# Patient Record
Sex: FEMALE | Race: WHITE | NOT HISPANIC OR LATINO | Employment: FULL TIME | URBAN - METROPOLITAN AREA
[De-identification: names, ages, dates, MRNs, and addresses within clinical notes are randomized per-mention and may not be internally consistent; named-entity substitution may affect disease eponyms.]

---

## 2017-10-13 ENCOUNTER — HOSPITAL ENCOUNTER (OUTPATIENT)
Dept: OTHER | Facility: HOSPITAL | Age: 42
Setting detail: SPECIMEN
Discharge: HOME | End: 2017-10-13
Attending: FAMILY MEDICINE | Admitting: FAMILY MEDICINE

## 2021-03-25 ENCOUNTER — OFFICE VISIT (OUTPATIENT)
Dept: FAMILY MEDICINE CLINIC | Facility: CLINIC | Age: 46
End: 2021-03-25

## 2021-03-25 VITALS
DIASTOLIC BLOOD PRESSURE: 70 MMHG | RESPIRATION RATE: 18 BRPM | SYSTOLIC BLOOD PRESSURE: 112 MMHG | OXYGEN SATURATION: 99 % | TEMPERATURE: 96.6 F | HEIGHT: 62 IN | HEART RATE: 64 BPM | WEIGHT: 161.8 LBS | BODY MASS INDEX: 29.77 KG/M2

## 2021-03-25 DIAGNOSIS — K21.9 GASTROESOPHAGEAL REFLUX DISEASE WITHOUT ESOPHAGITIS: Primary | ICD-10-CM

## 2021-03-25 DIAGNOSIS — M26.622 ARTHRALGIA OF LEFT TEMPOROMANDIBULAR JOINT: ICD-10-CM

## 2021-03-25 PROBLEM — J30.2 SEASONAL ALLERGIES: Status: ACTIVE | Noted: 2021-03-25

## 2021-03-25 PROCEDURE — 99214 OFFICE O/P EST MOD 30 MIN: CPT | Performed by: FAMILY MEDICINE

## 2021-03-25 RX ORDER — ACETAMINOPHEN AND CODEINE PHOSPHATE 120; 12 MG/5ML; MG/5ML
1 SOLUTION ORAL DAILY
COMMUNITY
Start: 2021-03-12 | End: 2023-03-02 | Stop reason: SDUPTHER

## 2021-03-25 RX ORDER — BUPROPION HYDROCHLORIDE 150 MG/1
150 TABLET ORAL EVERY MORNING
COMMUNITY
Start: 2021-03-14

## 2021-03-25 RX ORDER — VALACYCLOVIR HYDROCHLORIDE 500 MG/1
TABLET, FILM COATED ORAL AS NEEDED
COMMUNITY
End: 2022-05-19 | Stop reason: SDUPTHER

## 2021-03-25 RX ORDER — PANTOPRAZOLE SODIUM 40 MG/1
40 TABLET, DELAYED RELEASE ORAL DAILY
Qty: 30 TABLET | Refills: 3 | Status: SHIPPED | OUTPATIENT
Start: 2021-03-25 | End: 2021-07-21

## 2021-03-25 RX ORDER — QUETIAPINE FUMARATE 50 MG/1
TABLET, FILM COATED ORAL
COMMUNITY
Start: 2021-03-14

## 2021-03-25 NOTE — PROGRESS NOTES
"Chief Complaint  Establish Care, Earache (left ear), and Heartburn    Subjective          Kelly Wilson presents to [facility]  Patient is new to me today.  She is here to discuss her history of reflux.  She has had reflux since her teenage years.  She has tried several different medications over-the-counter for her reflux but the one that tends to help the most is Prilosec.  She currently is taking 40 mg once daily and sometimes twice daily when it is really bad.  She has been taking Prilosec for the last 12 to 13 years.  In 2015 the patient saw GI and had an endoscopy which was found to be normal except for inflammation.  She had had a prior endoscopy and colonoscopy in the 90s which revealed a duodenal ulceration.     She has also been having a new problem.  She states that she has some pain around her left ear region for the past 5 days.  When she opens her mouth it activates the pain.  She denies any discharge from the ear or decreased hearing.  No dizziness or lightheadedness. But she has some brief intermittent ringing in the left ear in the past week.  Patient does suffer from allergies.  She has not been taking anything yet.  Sudafed helped today.  She did have a sinus infection at the end of Feb.        Objective   Vital Signs:   /70 (BP Location: Left arm, Patient Position: Sitting, Cuff Size: Adult)   Pulse 64   Temp 96.6 °F (35.9 °C) (Temporal)   Resp 18   Ht 157.5 cm (62\")   Wt 73.4 kg (161 lb 12.8 oz)   SpO2 99%   BMI 29.59 kg/m²     Physical Exam  Vitals and nursing note reviewed.   Constitutional:       Appearance: Normal appearance. She is well-developed.   HENT:      Head: Normocephalic and atraumatic.   Eyes:      General: No scleral icterus.     Conjunctiva/sclera: Conjunctivae normal.   Cardiovascular:      Rate and Rhythm: Normal rate and regular rhythm.      Heart sounds: Normal heart sounds.   Pulmonary:      Effort: Pulmonary effort is normal.     "  Breath sounds: Normal breath sounds.   Abdominal:      General: Bowel sounds are normal. There is no distension.      Palpations: Abdomen is soft. There is no mass.      Tenderness: There is no abdominal tenderness. There is no right CVA tenderness, left CVA tenderness, guarding or rebound.      Hernia: No hernia is present.   Musculoskeletal:         General: Normal range of motion.      Cervical back: Normal range of motion and neck supple.      Right lower leg: No edema.      Left lower leg: No edema.   Skin:     General: Skin is warm and dry.      Capillary Refill: Capillary refill takes less than 2 seconds.      Findings: No rash.   Neurological:      Mental Status: She is alert and oriented to person, place, and time.   Psychiatric:         Mood and Affect: Mood normal.         Behavior: Behavior normal.         Thought Content: Thought content normal.         Judgment: Judgment normal.        Result Review :                 Assessment and Plan    Diagnoses and all orders for this visit:    1. Gastroesophageal reflux disease without esophagitis (Primary)  -     pantoprazole (PROTONIX) 40 MG EC tablet; Take 1 tablet by mouth Daily.  Dispense: 30 tablet; Refill: 3    2. Dislocation of temporomandibular joint, initial encounter      I have recommended the patient follow-up with her dentist and be fitted for a bite guard for the TMJ.  If she chooses to take ibuprofen she should take it only with food and at the lowest effective dose for the shortest duration.  She may use ice topically.  I will switch the PPI from omeprazole to pantoprazole 40 mg daily.  I have informed the patient that I would like her symptoms fully controlled on the lowest effective dose.  I would consider GI referral in the future if we are unable to taper her off of PPIs over the next year.  Patient was given information regarding food choices with reflux.      Follow Up   Return in about 3 months (around 6/25/2021) for Annual  physical.  Patient was given instructions and counseling regarding her condition or for health maintenance advice. Please see specific information pulled into the AVS if appropriate.

## 2021-03-25 NOTE — PATIENT INSTRUCTIONS
Food Choices for Gastroesophageal Reflux Disease, Adult  When you have gastroesophageal reflux disease (GERD), the foods you eat and your eating habits are very important. Choosing the right foods can help ease your discomfort. Think about working with a food expert (dietitian) to help you make good choices.  What are tips for following this plan?  Reading food labels  · Read the label for foods that are low in saturated fat. Foods that may help with your symptoms include:  ? Foods that have less than 5% of daily value (DV) of fat.  ? Foods that have 0 grams of trans fat.  Cooking  · Do not acevedo your food. Cook your food by baking, steaming, grilling, or broiling. These are all methods that do not need a lot of fat for cooking.  · To add flavor, try to use herbs that are low in spice and acidity.  Meal planning    · Choose healthy foods that are low in fat, such as:  ? Fruits and vegetables.  ? Whole grains.  ? Low-fat dairy products.  ? Lean meats, fish, and poultry.  · Eat small meals often instead of eating 3 large meals each day. Eat your meals slowly in a place where you are relaxed. Avoid bending over or lying down until 2-3 hours after eating.  · Limit high-fat foods such as fatty meats or fried foods.  · Limit your intake of oils, butter, and shortening to less than 8 teaspoons each day.  · Avoid the following:  ? Foods that cause symptoms. These may be different for different people. Keep a food diary to keep track of foods that cause symptoms.  ? Alcohol.  ? Drinking a lot of liquid with meals.  ? Eating meals during the 2-3 hours before bed.  Lifestyle  · Stay at a healthy weight. Ask your doctor what weight is healthy for you. If you need to lose weight, work with your doctor to do so safely.  · Exercise for at least 30 minutes on 5 or more days each week, or as told by your doctor.  · Wear loose-fitting clothes.  · Do not use any products that contain nicotine or tobacco, such as cigarettes,  e-cigarettes, and chewing tobacco. If you need help quitting, ask your doctor.  · Sleep with the head of your bed higher than your feet. Use a wedge under the mattress or blocks under the bed frame to raise the head of the bed.  What foods should eat?    Eat a healthy, well-balanced diet of fruits, vegetables, whole grains, low-fat dairy products, lean meats, fish, and poultry. Each person is different. Foods that may cause symptoms in one person may not cause any symptoms in another person. Work with your doctor to find foods that are safe for you.  The items listed above may not be a complete list of foods and beverages you can eat. Contact a dietitian for more information.  What foods should I avoid?  Limiting some of these foods may help in managing the symptoms of GERD. Everyone is different. Talk with a food expert or your doctor to help you find the exact foods to avoid, if any.  Fruits  Any fruits prepared with added fat. Any fruits that cause symptoms. For some people, this may include citrus fruits, such as oranges, grapefruit, pineapple, and caroline.  Vegetables  Deep-fried vegetables. French fries. Any vegetables prepared with added fat. Any vegetables that cause symptoms. For some people, this may include tomatoes and tomato products, chili peppers, onions and garlic, and horseradish.  Grains  Pastries or quick breads with added fat.  Meats and other proteins  High-fat meats, such as fatty beef or pork, hot dogs, ribs, ham, sausage, salami, and coy. Fried meat or protein, including fried fish and fried chicken. Nuts and nut butters.  Dairy  Whole milk and chocolate milk. Sour cream. Cream. Ice cream. Cream cheese. Milkshakes.  Fats and oils  Butter. Margarine. Shortening. Ghee.  Beverages  Coffee and tea, with or without caffeine. Carbonated beverages. Sodas. Energy drinks. Fruit juice made with acidic fruits (such as orange or grapefruit). Tomato juice. Alcoholic drinks.  Sweets and  desserts  Chocolate and cocoa. Donuts.  Seasonings and condiments  Pepper. Peppermint and spearmint. Added salt. Any condiments, herbs, or seasonings that cause symptoms. For some people, this may include geiger, hot sauce, or vinegar-based salad dressings.  The items listed above may not be a complete list of foods and beverages you should avoid. Contact a dietitian for more information.  Questions to ask your doctor  Diet and lifestyle changes are often the first steps that are taken to manage symptoms of GERD. If diet and lifestyle changes do not help, talk with your doctor about taking medicines.  Where to find more information  · [employer] G[employer]: aboutgerd.org  Summary  · When you have GERD, food and lifestyle choices are very important in easing your symptoms.  · Eat small meals often instead of 3 large meals a day. Eat your meals slowly and in a place where you are relaxed.  · Avoid bending over or lying down until 2-3 hours after eating.  · Limit high-fat foods such as fatty meat or fried foods.  This information is not intended to replace advice given to you by your health care provider. Make sure you discuss any questions you have with your health care provider.  Document Revised: 10/12/2020 Document Reviewed: 10/12/2020  Elsevier Patient Education © 2021 [employer]

## 2021-07-21 DIAGNOSIS — K21.9 GASTROESOPHAGEAL REFLUX DISEASE WITHOUT ESOPHAGITIS: ICD-10-CM

## 2021-07-21 RX ORDER — PANTOPRAZOLE SODIUM 40 MG/1
40 TABLET, DELAYED RELEASE ORAL DAILY
Qty: 30 TABLET | Refills: 3 | Status: SHIPPED | OUTPATIENT
Start: 2021-07-21 | End: 2021-09-01

## 2021-07-28 ENCOUNTER — TELEPHONE (OUTPATIENT)
Dept: FAMILY MEDICINE CLINIC | Facility: CLINIC | Age: 46
End: 2021-07-28

## 2021-07-28 ENCOUNTER — OFFICE VISIT (OUTPATIENT)
Dept: FAMILY MEDICINE CLINIC | Facility: CLINIC | Age: 46
End: 2021-07-28

## 2021-07-28 VITALS
HEART RATE: 83 BPM | HEIGHT: 62 IN | DIASTOLIC BLOOD PRESSURE: 78 MMHG | OXYGEN SATURATION: 99 % | TEMPERATURE: 97.8 F | WEIGHT: 162.8 LBS | SYSTOLIC BLOOD PRESSURE: 122 MMHG | BODY MASS INDEX: 29.96 KG/M2

## 2021-07-28 DIAGNOSIS — Z00.00 ENCOUNTER FOR WELLNESS EXAMINATION IN ADULT: Primary | ICD-10-CM

## 2021-07-28 DIAGNOSIS — K21.9 GASTROESOPHAGEAL REFLUX DISEASE WITHOUT ESOPHAGITIS: ICD-10-CM

## 2021-07-28 DIAGNOSIS — Z13.220 ENCOUNTER FOR LIPID SCREENING FOR CARDIOVASCULAR DISEASE: ICD-10-CM

## 2021-07-28 DIAGNOSIS — Z13.6 ENCOUNTER FOR LIPID SCREENING FOR CARDIOVASCULAR DISEASE: ICD-10-CM

## 2021-07-28 DIAGNOSIS — Z13.1 SCREENING FOR DIABETES MELLITUS: ICD-10-CM

## 2021-07-28 LAB
ALBUMIN SERPL-MCNC: 4.2 G/DL (ref 3.5–5.2)
ALBUMIN/GLOB SERPL: 1.8 G/DL
ALP SERPL-CCNC: 79 U/L (ref 39–117)
ALT SERPL-CCNC: 22 U/L (ref 1–33)
AST SERPL-CCNC: 19 U/L (ref 1–32)
BILIRUB SERPL-MCNC: 0.3 MG/DL (ref 0–1.2)
BUN SERPL-MCNC: 9 MG/DL (ref 6–20)
BUN/CREAT SERPL: 11.1 (ref 7–25)
CALCIUM SERPL-MCNC: 9.9 MG/DL (ref 8.6–10.5)
CHLORIDE SERPL-SCNC: 105 MMOL/L (ref 98–107)
CHOLEST SERPL-MCNC: 205 MG/DL (ref 0–200)
CO2 SERPL-SCNC: 27.8 MMOL/L (ref 22–29)
CREAT SERPL-MCNC: 0.81 MG/DL (ref 0.57–1)
GLOBULIN SER CALC-MCNC: 2.4 GM/DL
GLUCOSE SERPL-MCNC: 95 MG/DL (ref 65–99)
HDLC SERPL-MCNC: 54 MG/DL (ref 40–60)
LDLC SERPL CALC-MCNC: 127 MG/DL (ref 0–100)
POTASSIUM SERPL-SCNC: 4.3 MMOL/L (ref 3.5–5.2)
PROT SERPL-MCNC: 6.6 G/DL (ref 6–8.5)
SODIUM SERPL-SCNC: 141 MMOL/L (ref 136–145)
TRIGL SERPL-MCNC: 135 MG/DL (ref 0–150)
VLDLC SERPL CALC-MCNC: 24 MG/DL (ref 5–40)

## 2021-07-28 PROCEDURE — 99396 PREV VISIT EST AGE 40-64: CPT | Performed by: FAMILY MEDICINE

## 2021-07-28 NOTE — PROGRESS NOTES
Subjective   Kelly Wilson is a 45 y.o. female who presents for annual female wellness exam.  Chief Complaint   Patient presents with   • Annual Exam     Without PAP   Patient is also here to follow-up on GERD.  I had started her on pantoprazole 40 mg daily and the patient did not have any relief of her heartburn symptoms.  She stated that she took as many as up to 3 pantoprazole daily without relief.  In the past she saw GI and had endoscopy in 2015 with [facility], Dr. Jaimes.  After reviewing some of the care everywhere documents from Dr. Jaimes it appears she may have a diagnosis of Bergman's esophagitis.     Menstrual History: July 23, 2021 was her last menstrual period, regular  Pregnancy History: G0  Sexual History: Monogamous male partner for the past 4 years.  Contraception: OCPs  Hormone Replacement Therapy: No  Diet: balanced  Exercise: walking most days, gym- some weights  Do you feel safe? Yes  Have you ever been abused? No    Mammogram: per Gyn, 7/20/2020  Pap Smear: 2019 per [facility], normal.  She has a appointment tomorrow with GYN for her Pap smear  Bone Density: never  Colon Cancer Screening: Patient is being referred back to Dr. Jaimes, [facility] and I have advised her to ask Dr. Jaimes when she should start her colon cancer screening since she is almost 46.    Immunization History   Administered Date(s) Administered   • COVID-19 (MODERNA) 12/22/2020, 01/20/2021       The following portions of the patient's history were reviewed and updated as appropriate: allergies, current medications, past family history, past medical history, past social history, past surgical history and problem list.    Past Medical History:   Diagnosis Date   • Anxiety    • Depressed    • GERD (gastroesophageal reflux disease)    • PMDD (premenstrual dysphoric disorder)        Past Surgical History:   Procedure Laterality Date   • CHOLECYSTECTOMY  1997   • WISDOM TOOTH EXTRACTION         Family  History   Problem Relation Age of Onset   • Diabetes type II Mother    • Heart failure Father    • Diabetes type II Father    • Heart disease Father    • Heart attack Father    • Diabetes type II Brother    • Stomach cancer Maternal Grandmother    • Heart attack Maternal Grandfather    • Diverticulitis Paternal Grandmother    • Heart failure Paternal Grandmother    • Heart disease Paternal Grandfather    • No Known Problems Brother        Social History     Socioeconomic History   • Marital status:      Spouse name: Not on file   • Number of children: 0   • Years of education: Not on file   • Highest education level: Not on file   Tobacco Use   • Smoking status: Former Smoker     Packs/day: 0.25     Years: 20.00     Pack years: 5.00     Start date: 1990     Quit date: 2020     Years since quittin.6   • Smokeless tobacco: Never Used   Vaping Use   • Vaping Use: Never used   Substance and Sexual Activity   • Alcohol use: Yes     Comment: social    • Drug use: No   • Sexual activity: Yes     Partners: Male       Review of Systems   Constitutional: Negative for activity change, appetite change, fatigue and unexpected weight change.   HENT: Negative for congestion, ear pain, nosebleeds, sore throat and tinnitus.    Eyes: Negative for pain, redness and visual disturbance.   Respiratory: Negative for cough, shortness of breath and wheezing.    Cardiovascular: Negative for chest pain, palpitations and leg swelling.   Gastrointestinal: Negative for abdominal pain, blood in stool and nausea.   Endocrine: Negative for polydipsia and polyuria.   Genitourinary: Negative for dysuria, frequency, menstrual problem and vaginal discharge.   Musculoskeletal: Negative for arthralgias, joint swelling and myalgias.   Skin: Negative for rash.   Allergic/Immunologic: Negative for environmental allergies, food allergies and immunocompromised state.   Neurological: Negative for dizziness, speech difficulty, weakness and  headaches.   Hematological: Negative for adenopathy. Does not bruise/bleed easily.   Psychiatric/Behavioral: Negative for decreased concentration and dysphoric mood. The patient is not nervous/anxious.        Objective   Vitals:    07/28/21 0810   BP: 122/78   Pulse: 83   Temp: 97.8 °F (36.6 °C)   SpO2: 99%     Body mass index is 29.78 kg/m².  Physical Exam  Vitals and nursing note reviewed.   Constitutional:       Appearance: Normal appearance. She is well-developed.   HENT:      Head: Normocephalic and atraumatic.   Eyes:      General: No scleral icterus.     Conjunctiva/sclera: Conjunctivae normal.   Cardiovascular:      Rate and Rhythm: Normal rate and regular rhythm.      Heart sounds: Normal heart sounds.   Pulmonary:      Effort: Pulmonary effort is normal.      Breath sounds: Normal breath sounds.   Abdominal:      General: Bowel sounds are normal.      Palpations: Abdomen is soft.   Musculoskeletal:         General: Normal range of motion.      Cervical back: Normal range of motion and neck supple.      Right lower leg: No edema.      Left lower leg: No edema.   Skin:     General: Skin is warm and dry.      Capillary Refill: Capillary refill takes less than 2 seconds.      Findings: No rash.   Neurological:      Mental Status: She is alert and oriented to person, place, and time.   Psychiatric:         Behavior: Behavior normal.         Thought Content: Thought content normal.         Judgment: Judgment normal.         Assessment/Plan   Diagnoses and all orders for this visit:    1. Encounter for wellness examination in adult (Primary)    2. Gastroesophageal reflux disease without esophagitis  -     Ambulatory Referral to Gastroenterology    3. Encounter for lipid screening for cardiovascular disease  -     Lipid Panel    4. Screening for diabetes mellitus  -     Comprehensive Metabolic Panel        Patient is not fasting today she did have a breakfast bar and a coffee before coming into the office.  All  screening is up-to-date or will be brought up-to-date after she sees GI and Gyn   Discussed the importance of maintaining a healthy weight and getting regular exercise.  Educated patient on the benefits of healthy diet.  Advise follow-up annually for wellness exams.      There are no Patient Instructions on file for this visit.

## 2021-07-28 NOTE — TELEPHONE ENCOUNTER
PATIENT STATES SHE SEEN DR. CASTILLO THIS MORNING AND SHE WAS GOING TO RE- REFER HER TO HER PREVIOUS GASTRO DOCTOR BUT PATIENT IS REQUESTING FOR DR. CASTILLO TO ACTUALLY PUT HER IN A REFERRAL TO SOMEONE DIFFERENT, SOMEONE WHO IS IN THE [facility] NETWORK AS WELL...      PATIENT CAN BE REACHED AT: [phone]

## 2021-08-24 ENCOUNTER — OFFICE VISIT (OUTPATIENT)
Dept: FAMILY MEDICINE CLINIC | Facility: CLINIC | Age: 46
End: 2021-08-24

## 2021-08-24 VITALS
BODY MASS INDEX: 29 KG/M2 | DIASTOLIC BLOOD PRESSURE: 70 MMHG | OXYGEN SATURATION: 98 % | TEMPERATURE: 98.3 F | SYSTOLIC BLOOD PRESSURE: 110 MMHG | HEIGHT: 62 IN | HEART RATE: 73 BPM | WEIGHT: 157.6 LBS

## 2021-08-24 DIAGNOSIS — H00.14 CHALAZION OF LEFT UPPER EYELID: ICD-10-CM

## 2021-08-24 DIAGNOSIS — G44.219 EPISODIC TENSION-TYPE HEADACHE, NOT INTRACTABLE: Primary | ICD-10-CM

## 2021-08-24 PROCEDURE — 99214 OFFICE O/P EST MOD 30 MIN: CPT | Performed by: FAMILY MEDICINE

## 2021-08-24 NOTE — PROGRESS NOTES
"Answers for HPI/ROS submitted by the patient on 8/24/2021  Please describe your symptoms.: Eye pain and ongoing headaches  Have you had these symptoms before?: Yes  How long have you been having these symptoms?: Greater than 2 weeks  What is the primary reason for your visit?: Other    Chief Complaint  Skin Lesion (Under eye, Left) and Headache (Going on for about 5 months, starts from back of head moves upwards)    Allison Wilson presents to [facility]  Patient is here today with a new problem.  3 days ago the patient started noticing a bump within her left upper eyelid.  She has done some warm compresses and it has gotten a little bit better but it is still there.  She denies any eye pain or vision problems.      She is also here to discuss headaches.  This is a new problem for her.  She states that over the past 5 months she has been having more headaches.  They generally start as neck pain and bilateral shoulder blade pain and then move forward over both sides of her head.  The patient states that it feels like a vice on the back of her head and neck.  Acetaminophen or ibuprofen does help some.  Patient denies any vision problems but she does have a vision exam scheduled for later this week.  She also denies any dizziness or other neurologic symptoms.  Patient denies any nausea associated with her headaches.  Patient does work and go to school so she does have a good bit of stress but she feels that she is handling it well.        Objective   Vital Signs:   /70   Pulse 73   Temp 98.3 °F (36.8 °C)   Ht 157.5 cm (62\")   Wt 71.5 kg (157 lb 9.6 oz)   SpO2 98%   BMI 28.83 kg/m²     Physical Exam  Vitals and nursing note reviewed.   Constitutional:       Appearance: She is well-developed.   HENT:      Head: Normocephalic and atraumatic.   Eyes:      General: No scleral icterus.     Conjunctiva/sclera: Conjunctivae normal.   Cardiovascular:      Rate and " Rhythm: Normal rate and regular rhythm.      Heart sounds: Normal heart sounds.   Pulmonary:      Effort: Pulmonary effort is normal.      Breath sounds: Normal breath sounds.   Abdominal:      General: Bowel sounds are normal.      Palpations: Abdomen is soft.   Musculoskeletal:         General: Normal range of motion.      Cervical back: Normal range of motion and neck supple.   Skin:     General: Skin is warm and dry.      Capillary Refill: Capillary refill takes less than 2 seconds.      Findings: No rash.   Neurological:      General: No focal deficit present.      Mental Status: She is alert and oriented to person, place, and time.      Cranial Nerves: Cranial nerves are intact.      Sensory: Sensation is intact.      Motor: Motor function is intact.      Coordination: Coordination is intact.      Gait: Gait is intact.      Deep Tendon Reflexes:      Reflex Scores:       Tricep reflexes are 2+ on the right side and 2+ on the left side.       Bicep reflexes are 2+ on the right side and 2+ on the left side.       Brachioradialis reflexes are 2+ on the right side and 2+ on the left side.       Patellar reflexes are 2+ on the right side and 2+ on the left side.       Achilles reflexes are 2+ on the right side and 2+ on the left side.     Comments: Normal visual fields bilaterally   Psychiatric:         Mood and Affect: Mood normal.         Behavior: Behavior normal.         Thought Content: Thought content normal.         Judgment: Judgment normal.        Result Review :{Labs  Result Review  Imaging  Med Tab  Media  Procedures :23}   The following data was reviewed by: Lynnette Yan DO on 08/24/2021:  Common labs    Common Labsle 7/28/21 7/28/21    0855 0855   Glucose  95   BUN  9   Creatinine  0.81   eGFR Non  Am  77   eGFR  Am  93   Sodium  141   Potassium  4.3   Chloride  105   Calcium  9.9   Total Protein  6.6   Albumin  4.20   Total Bilirubin  0.3   Alkaline Phosphatase  79   AST (SGOT)   19   ALT (SGPT)  22   Total Cholesterol 205 (A)    Triglycerides 135    HDL Cholesterol 54    LDL Cholesterol  127 (A)    (A) Abnormal value       Comments are available for some flowsheets but are not being displayed.                         Assessment and Plan    Diagnoses and all orders for this visit:    1. Episodic tension-type headache, not intractable (Primary)    2. Chalazion of left upper eyelid    Patient is here today with 2 new problems.  For the tension headaches I have advised the patient to exercise daily.  She may use over-the-counter painkillers as needed but avoid daily use.  It is also recommended that she try meditation as well.  Patient states that she has been planning to restart exercise.  For the chalazion I have advised warm compresses as often as possible until gone.  If the patient has any worsening symptoms she is advised to follow-up.  I spent 32 minutes caring for Kelly on this date of service. This time includes time spent by me in the following activities:reviewing tests, performing a medically appropriate examination and/or evaluation , counseling and educating the patient/family/caregiver and documenting information in the medical record  Follow Up   Return if symptoms worsen or fail to improve.  Patient was given instructions and counseling regarding her condition or for health maintenance advice. Please see specific information pulled into the AVS if appropriate.

## 2021-09-01 ENCOUNTER — TELEPHONE (OUTPATIENT)
Dept: GASTROENTEROLOGY | Facility: CLINIC | Age: 46
End: 2021-09-01

## 2021-09-01 ENCOUNTER — OFFICE VISIT (OUTPATIENT)
Dept: GASTROENTEROLOGY | Facility: CLINIC | Age: 46
End: 2021-09-01

## 2021-09-01 VITALS
SYSTOLIC BLOOD PRESSURE: 118 MMHG | DIASTOLIC BLOOD PRESSURE: 70 MMHG | WEIGHT: 160.5 LBS | BODY MASS INDEX: 29.53 KG/M2 | TEMPERATURE: 97.5 F | HEIGHT: 62 IN | HEART RATE: 84 BPM

## 2021-09-01 DIAGNOSIS — Z12.11 ENCOUNTER FOR SCREENING FOR MALIGNANT NEOPLASM OF COLON: ICD-10-CM

## 2021-09-01 DIAGNOSIS — K21.9 GASTROESOPHAGEAL REFLUX DISEASE WITHOUT ESOPHAGITIS: Primary | ICD-10-CM

## 2021-09-01 PROCEDURE — 99204 OFFICE O/P NEW MOD 45 MIN: CPT | Performed by: INTERNAL MEDICINE

## 2021-09-01 RX ORDER — SODIUM PHOSPHATE,MONO-DIBASIC 19G-7G/118
ENEMA (ML) RECTAL DAILY
COMMUNITY
End: 2022-02-25

## 2021-09-01 RX ORDER — DEXLANSOPRAZOLE 60 MG/1
60 CAPSULE, DELAYED RELEASE ORAL DAILY
Qty: 90 CAPSULE | Refills: 1 | Status: SHIPPED | OUTPATIENT
Start: 2021-09-01 | End: 2021-09-03

## 2021-09-01 RX ORDER — OMEPRAZOLE 20 MG/1
20 CAPSULE, DELAYED RELEASE ORAL 2 TIMES DAILY
COMMUNITY
End: 2021-09-01

## 2021-09-01 RX ORDER — MAGNESIUM 200 MG
TABLET ORAL DAILY
COMMUNITY
End: 2022-10-12

## 2021-09-01 RX ORDER — SODIUM CHLORIDE, SODIUM LACTATE, POTASSIUM CHLORIDE, CALCIUM CHLORIDE 600; 310; 30; 20 MG/100ML; MG/100ML; MG/100ML; MG/100ML
30 INJECTION, SOLUTION INTRAVENOUS CONTINUOUS
Status: CANCELLED | OUTPATIENT
Start: 2021-10-05

## 2021-09-01 NOTE — PROGRESS NOTES
Chief Complaint   Patient presents with   • Heartburn   • Bergman's esophagus     Subjective   HPI  Kelly Wilson is a 45 y.o. female who presents today for new patient evaluation.    She complains of longstanding GERD.  Symptoms dating back to her teenage years.  Sensation of burning through her chest and fluid in her throat on occasion.  She has been on various over-the-counter and prescription-based PPI therapy with intermittent success.  More recently she was put on Protonix 40 mg by her PCP but did not find this to be very effective at all and actually switch back to over-the-counter omeprazole.    She had previous upper GI evaluation in 2015 with Dr. Jaimes.  I was able to review those records through care everywhere.  There was evidence of some mild reflux esophagitis and suspicion for Bergman's but biopsies returned showing no evidence of intestinal metaplasia.  She had a remote colonoscopy in the early 2000's.  She has no lower GI complaints.      Objective   Vitals:    09/01/21 0854   BP: 118/70   Pulse: 84   Temp: 97.5 °F (36.4 °C)     Physical Exam  Vitals reviewed.   Constitutional:       Appearance: She is well-developed.   Cardiovascular:      Rate and Rhythm: Normal rate and regular rhythm.      Heart sounds: Normal heart sounds.   Pulmonary:      Effort: Pulmonary effort is normal. No respiratory distress.      Breath sounds: Normal breath sounds. No wheezing.   Abdominal:      General: Bowel sounds are normal.      Palpations: Abdomen is soft.   Musculoskeletal:      Cervical back: Normal range of motion and neck supple.   Skin:     General: Skin is warm and dry.   Neurological:      Mental Status: She is alert and oriented to person, place, and time.   Psychiatric:         Mood and Affect: Mood and affect normal.         Behavior: Behavior normal.         Thought Content: Thought content normal.         Cognition and Memory: Memory normal.         Judgment: Judgment normal.       The following data  was reviewed by: Obed Hendrix MD on 09/01/2021:  Common labs    Common Labsle 7/28/21 7/28/21    0855 0855   Glucose  95   BUN  9   Creatinine  0.81   eGFR Non  Am  77   eGFR  Am  93   Sodium  141   Potassium  4.3   Chloride  105   Calcium  9.9   Total Protein  6.6   Albumin  4.20   Total Bilirubin  0.3   Alkaline Phosphatase  79   AST (SGOT)  19   ALT (SGPT)  22   Total Cholesterol 205 (A)    Triglycerides 135    HDL Cholesterol 54    LDL Cholesterol  127 (A)    (A) Abnormal value       Comments are available for some flowsheets but are not being displayed.           Data reviewed: GI studies From [facility] 2015     Assessment/Plan   Assessment:     1. Gastroesophageal reflux disease without esophagitis    2. Encounter for screening for malignant neoplasm of colon      Plan:   Change PPI to Dexilant 60mg/day  Recommend addition of Gaviscon Advance, information on how to order provided  Schedule EGD and average risk screening colonoscopy            Obed Hendrix M.D.  [facility]  [address] [place] [address]  Office: [phone]

## 2021-09-03 ENCOUNTER — TELEPHONE (OUTPATIENT)
Dept: GASTROENTEROLOGY | Facility: CLINIC | Age: 46
End: 2021-09-03

## 2021-09-03 RX ORDER — OMEPRAZOLE 40 MG/1
40 CAPSULE, DELAYED RELEASE ORAL DAILY
Qty: 90 CAPSULE | Refills: 3 | Status: SHIPPED | OUTPATIENT
Start: 2021-09-03 | End: 2022-08-11 | Stop reason: SDUPTHER

## 2021-09-03 RX ORDER — SUCRALFATE 1 G/1
1 TABLET ORAL
Qty: 120 TABLET | Refills: 0 | Status: SHIPPED | OUTPATIENT
Start: 2021-09-03 | End: 2021-09-03

## 2021-09-03 RX ORDER — SUCRALFATE 1 G/1
TABLET ORAL
Qty: 120 TABLET | Refills: 0 | Status: SHIPPED | OUTPATIENT
Start: 2021-09-03 | End: 2022-02-25

## 2022-01-11 ENCOUNTER — TELEPHONE (OUTPATIENT)
Dept: GASTROENTEROLOGY | Facility: CLINIC | Age: 47
End: 2022-01-11

## 2022-01-11 NOTE — TELEPHONE ENCOUNTER
Daniel dickey,  is wanting to reschedule her procedure if you could please give her a call thanks

## 2022-01-17 ENCOUNTER — TELEPHONE (OUTPATIENT)
Dept: GASTROENTEROLOGY | Facility: CLINIC | Age: 47
End: 2022-01-17

## 2022-01-17 NOTE — TELEPHONE ENCOUNTER
spoke with pt rescheduled for feb 28 arrive at 0700 am vijaya pak--mailed new paper work per pt request

## 2022-02-23 ENCOUNTER — TRANSCRIBE ORDERS (OUTPATIENT)
Dept: ADMINISTRATIVE | Facility: HOSPITAL | Age: 47
End: 2022-02-23

## 2022-02-23 DIAGNOSIS — Z01.818 OTHER SPECIFIED PRE-OPERATIVE EXAMINATION: Primary | ICD-10-CM

## 2022-02-25 ENCOUNTER — LAB (OUTPATIENT)
Dept: LAB | Facility: HOSPITAL | Age: 47
End: 2022-02-25

## 2022-02-25 DIAGNOSIS — Z01.818 OTHER SPECIFIED PRE-OPERATIVE EXAMINATION: ICD-10-CM

## 2022-02-25 LAB — SARS-COV-2 ORF1AB RESP QL NAA+PROBE: NOT DETECTED

## 2022-02-25 PROCEDURE — U0004 COV-19 TEST NON-CDC HGH THRU: HCPCS

## 2022-02-25 PROCEDURE — C9803 HOPD COVID-19 SPEC COLLECT: HCPCS

## 2022-02-28 ENCOUNTER — ANESTHESIA (OUTPATIENT)
Dept: GASTROENTEROLOGY | Facility: HOSPITAL | Age: 47
End: 2022-02-28

## 2022-02-28 ENCOUNTER — ANESTHESIA EVENT (OUTPATIENT)
Dept: GASTROENTEROLOGY | Facility: HOSPITAL | Age: 47
End: 2022-02-28

## 2022-02-28 ENCOUNTER — HOSPITAL ENCOUNTER (OUTPATIENT)
Facility: HOSPITAL | Age: 47
Setting detail: HOSPITAL OUTPATIENT SURGERY
Discharge: HOME | End: 2022-02-28
Attending: INTERNAL MEDICINE | Admitting: INTERNAL MEDICINE

## 2022-02-28 VITALS
BODY MASS INDEX: 28.69 KG/M2 | DIASTOLIC BLOOD PRESSURE: 67 MMHG | WEIGHT: 155.9 LBS | OXYGEN SATURATION: 100 % | TEMPERATURE: 98.3 F | RESPIRATION RATE: 16 BRPM | SYSTOLIC BLOOD PRESSURE: 104 MMHG | HEART RATE: 58 BPM | HEIGHT: 62 IN

## 2022-02-28 DIAGNOSIS — Z12.11 ENCOUNTER FOR SCREENING FOR MALIGNANT NEOPLASM OF COLON: ICD-10-CM

## 2022-02-28 DIAGNOSIS — K21.9 GASTROESOPHAGEAL REFLUX DISEASE WITHOUT ESOPHAGITIS: ICD-10-CM

## 2022-02-28 LAB
B-HCG UR QL: NEGATIVE
EXPIRATION DATE: NORMAL
INTERNAL NEGATIVE CONTROL: NEGATIVE
INTERNAL POSITIVE CONTROL: POSITIVE
Lab: NORMAL

## 2022-02-28 PROCEDURE — 45378 DIAGNOSTIC COLONOSCOPY: CPT | Performed by: INTERNAL MEDICINE

## 2022-02-28 PROCEDURE — 88305 TISSUE EXAM BY PATHOLOGIST: CPT | Performed by: INTERNAL MEDICINE

## 2022-02-28 PROCEDURE — 43239 EGD BIOPSY SINGLE/MULTIPLE: CPT | Performed by: INTERNAL MEDICINE

## 2022-02-28 PROCEDURE — 81025 URINE PREGNANCY TEST: CPT | Performed by: INTERNAL MEDICINE

## 2022-02-28 PROCEDURE — 25010000002 PROPOFOL 10 MG/ML EMULSION: Performed by: ANESTHESIOLOGY

## 2022-02-28 RX ORDER — SODIUM CHLORIDE, SODIUM LACTATE, POTASSIUM CHLORIDE, CALCIUM CHLORIDE 600; 310; 30; 20 MG/100ML; MG/100ML; MG/100ML; MG/100ML
30 INJECTION, SOLUTION INTRAVENOUS CONTINUOUS
Status: DISCONTINUED | OUTPATIENT
Start: 2022-02-28 | End: 2022-02-28 | Stop reason: HOSPADM

## 2022-02-28 RX ORDER — LIDOCAINE HYDROCHLORIDE 20 MG/ML
INJECTION, SOLUTION INFILTRATION; PERINEURAL AS NEEDED
Status: DISCONTINUED | OUTPATIENT
Start: 2022-02-28 | End: 2022-02-28 | Stop reason: SURG

## 2022-02-28 RX ORDER — SODIUM CHLORIDE 0.9 % (FLUSH) 0.9 %
3 SYRINGE (ML) INJECTION EVERY 12 HOURS SCHEDULED
Status: DISCONTINUED | OUTPATIENT
Start: 2022-02-28 | End: 2022-02-28 | Stop reason: HOSPADM

## 2022-02-28 RX ORDER — PROPOFOL 10 MG/ML
VIAL (ML) INTRAVENOUS CONTINUOUS PRN
Status: DISCONTINUED | OUTPATIENT
Start: 2022-02-28 | End: 2022-02-28 | Stop reason: SURG

## 2022-02-28 RX ORDER — SODIUM CHLORIDE 0.9 % (FLUSH) 0.9 %
10 SYRINGE (ML) INJECTION AS NEEDED
Status: DISCONTINUED | OUTPATIENT
Start: 2022-02-28 | End: 2022-02-28 | Stop reason: HOSPADM

## 2022-02-28 RX ADMIN — PROPOFOL 250 MCG/KG/MIN: 10 INJECTION, EMULSION INTRAVENOUS at 07:58

## 2022-02-28 RX ADMIN — LIDOCAINE HYDROCHLORIDE 60 MG: 20 INJECTION, SOLUTION INFILTRATION; PERINEURAL at 07:58

## 2022-02-28 RX ADMIN — SODIUM CHLORIDE, POTASSIUM CHLORIDE, SODIUM LACTATE AND CALCIUM CHLORIDE 30 ML/HR: 600; 310; 30; 20 INJECTION, SOLUTION INTRAVENOUS at 07:42

## 2022-02-28 NOTE — ANESTHESIA PREPROCEDURE EVALUATION
Anesthesia Evaluation     Patient summary reviewed and Nursing notes reviewed                Airway   Mallampati: I  TM distance: >3 FB  Neck ROM: full  No difficulty expected  Dental - normal exam     Pulmonary - negative pulmonary ROS and normal exam   Cardiovascular - negative cardio ROS and normal exam        Neuro/Psych  (+) psychiatric history Anxiety and Depression,    GI/Hepatic/Renal/Endo    (+)  GERD,      Musculoskeletal (-) negative ROS    Abdominal  - normal exam    Bowel sounds: normal.   Substance History - negative use     OB/GYN negative ob/gyn ROS         Other                        Anesthesia Plan    ASA 2     MAC       Anesthetic plan, all risks, benefits, and alternatives have been provided, discussed and informed consent has been obtained with: patient.        CODE STATUS:

## 2022-02-28 NOTE — ANESTHESIA POSTPROCEDURE EVALUATION
"Patient: Kelly Wilson    Procedure Summary     Date: 02/28/22 Room / Location:  [facility]    Anesthesia Start: 0753 Anesthesia Stop: 0828    Procedures:       ESOPHAGOGASTRODUODENOSCOPY WITH COLD BIOPSIES (N/A Esophagus)      COLONOSCOPY TO Cecum and TI (N/A ) Diagnosis:       Gastroesophageal reflux disease without esophagitis      Encounter for screening for malignant neoplasm of colon      (Gastroesophageal reflux disease without esophagitis [K21.9])      (Encounter for screening for malignant neoplasm of colon [Z12.11])    Surgeons: Obed Hendrix MD Provider: Zheng Bobo MD    Anesthesia Type: MAC ASA Status: 2          Anesthesia Type: MAC    Vitals  Vitals Value Taken Time   /67 02/28/22 0852   Temp     Pulse 58 02/28/22 0852   Resp 16 02/28/22 0852   SpO2 100 % 02/28/22 0852           Post Anesthesia Care and Evaluation    Patient location during evaluation: PACU  Patient participation: complete - patient participated  Level of consciousness: awake  Pain score: 0  Pain management: adequate  Airway patency: patent  Anesthetic complications: No anesthetic complications  PONV Status: none  Cardiovascular status: acceptable  Respiratory status: acceptable  Hydration status: acceptable    Comments: /67 (BP Location: Left arm, Patient Position: Lying)   Pulse 58   Temp 36.8 °C (98.3 °F) (Oral)   Resp 16   Ht 157.5 cm (62\")   Wt 70.7 kg (155 lb 14.4 oz)   SpO2 100%   BMI 28.51 kg/m²       "

## 2022-03-01 LAB
LAB AP CASE REPORT: NORMAL
PATH REPORT.FINAL DX SPEC: NORMAL
PATH REPORT.GROSS SPEC: NORMAL

## 2022-03-16 ENCOUNTER — OFFICE VISIT (OUTPATIENT)
Dept: OBSTETRICS AND GYNECOLOGY | Facility: CLINIC | Age: 47
End: 2022-03-16

## 2022-03-16 VITALS
WEIGHT: 157.5 LBS | DIASTOLIC BLOOD PRESSURE: 62 MMHG | BODY MASS INDEX: 28.98 KG/M2 | HEIGHT: 62 IN | SYSTOLIC BLOOD PRESSURE: 120 MMHG

## 2022-03-16 DIAGNOSIS — Z00.00 ANNUAL PHYSICAL EXAM: Primary | ICD-10-CM

## 2022-03-16 DIAGNOSIS — Z12.31 ENCOUNTER FOR SCREENING MAMMOGRAM FOR MALIGNANT NEOPLASM OF BREAST: ICD-10-CM

## 2022-03-16 PROCEDURE — 99396 PREV VISIT EST AGE 40-64: CPT | Performed by: NURSE PRACTITIONER

## 2022-03-16 NOTE — PROGRESS NOTES
GYN Annual Exam     CC - Here for annual exam.        HPI  Kelly Wilson is a 46 y.o. female, , who presents for annual well woman exam.  She is premenopausal.  Periods are regular every 25-35 days, lasting 4 days. .  Dysmenorrhea:mild, occurring first 1-2 days of flow..  Patient reports problems with: PMDD, she states that 2 weeks before her menses this occurs and she has tried two other OCP and they cause her bad headaches and an increase in her heart rate. She has not had a cycle yet on the POP to see if this will help her PMDD. . There were no changes to her medical or surgical history since her last visit.. Partner Status: Marital Status: .  New Partners since last visit: no.      Additional OB/GYN History   Current contraception: contraceptive methods: Oral progesterone-only contraceptive  Desires to: continue contraception  On HRT? No  Last Pap : . Results: Normal  Last Completed Pap Smear     This patient has no relevant Health Maintenance data.        History of abnormal Pap smear: yes - 5 years ago   Family history of uterine, colon, breast, or ovarian cancer: no  Performs monthly Self-Breast Exam: yes  Last mammogram: Normal. Done in 2020.    Last Completed Mammogram     This patient has no relevant Health Maintenance data.        Last colonoscopy: 2022 Normal  Last Completed Colonoscopy          COLORECTAL CANCER SCREENING (COLONOSCOPY - Every 10 Years) Next due on 2022  COLONOSCOPY              Last DEXA: None  Exercises Regularly: yes  Feelings of Anxiety or Depression: yes - medicated       Tobacco Usage?: No   OB History        0    Para   0    Term   0       0    AB   0    Living   0       SAB   0    IAB   0    Ectopic   0    Molar   0    Multiple   0    Live Births   0                Health Maintenance   Topic Date Due   • Annual Gynecologic Pelvic and Breast Exam  Never done   • TDAP/TD VACCINES (1 - Tdap) Never done   • HEPATITIS C  "SCREENING  Never done   • PAP SMEAR  Never done   • COVID-19 Vaccine (3 - Booster for Moderna series) 06/20/2021   • MAMMOGRAM  07/28/2021   • INFLUENZA VACCINE  Never done   • ANNUAL PHYSICAL  07/29/2022   • COLORECTAL CANCER SCREENING  02/28/2032   • Pneumococcal Vaccine 0-64  Aged Out       The additional following portions of the patient's history were reviewed and updated as appropriate: allergies, current medications, past family history, past medical history, past social history, past surgical history and problem list.    Review of Systems   Constitutional: Negative.    Respiratory: Negative.    Cardiovascular: Negative.    Gastrointestinal: Negative.    Genitourinary: Negative.    Neurological: Negative.    Hematological: Negative.    Psychiatric/Behavioral: Positive for agitation and depressed mood. The patient is nervous/anxious.        I have reviewed and agree with the HPI, ROS, and historical information as entered above. Manju Ortega, APRN    Objective   /62   Ht 157.5 cm (62\")   Wt 71.4 kg (157 lb 8 oz)   LMP 02/27/2022   BMI 28.81 kg/m²     Physical Exam  Vitals and nursing note reviewed. Exam conducted with a chaperone present.   Constitutional:       Appearance: She is well-developed.   HENT:      Head: Normocephalic and atraumatic.   Neck:      Thyroid: No thyroid mass or thyromegaly.   Cardiovascular:      Rate and Rhythm: Normal rate and regular rhythm.      Heart sounds: No murmur heard.  Pulmonary:      Effort: Pulmonary effort is normal. No retractions.      Breath sounds: Normal breath sounds. No wheezing, rhonchi or rales.   Chest:      Chest wall: No mass or tenderness.   Breasts:      Right: Normal. No mass, nipple discharge, skin change or tenderness.      Left: Normal. No mass, nipple discharge, skin change or tenderness.       Abdominal:      Palpations: Abdomen is soft. Abdomen is not rigid. There is no mass.      Tenderness: There is no abdominal tenderness. There is " no guarding.      Hernia: No hernia is present. There is no hernia in the left inguinal area.   Genitourinary:     Labia:         Right: No rash, tenderness or lesion.         Left: No rash, tenderness or lesion.       Vagina: Normal. No vaginal discharge or lesions.      Cervix: No cervical motion tenderness, discharge, lesion or cervical bleeding.      Uterus: Normal. Not enlarged, not fixed and not tender.       Adnexa:         Right: No mass or tenderness.          Left: No mass or tenderness.        Rectum: No external hemorrhoid.   Musculoskeletal:      Cervical back: Normal range of motion. No muscular tenderness.   Neurological:      Mental Status: She is alert and oriented to person, place, and time.   Psychiatric:         Behavior: Behavior normal.            Assessment and Plan    Problem List Items Addressed This Visit    None     Visit Diagnoses     Annual physical exam    -  Primary    Relevant Orders    Pap IG, HPV-hr    Encounter for screening mammogram for malignant neoplasm of breast        Relevant Orders    Mammo Screening Digital Tomosynthesis Bilateral With CAD        She has done well on POP in the past, she tried 2 different OUMAR's from her [facility] and they did not work well for her. She see's psych and has tried many different medications to help with this.     1. GYN annual well woman exam.   2. Reviewed monthly self breast exams.  Instructed to call with lumps, pain, or breast discharge.  Yearly mammograms ordered.  3. Ordered mammogram today.  4. Return in about 1 year (around 3/16/2023) for Annual physical.     Manju Ortega, APRN  03/16/2022

## 2022-03-22 ENCOUNTER — TELEPHONE (OUTPATIENT)
Dept: GASTROENTEROLOGY | Facility: CLINIC | Age: 47
End: 2022-03-22

## 2022-03-22 NOTE — TELEPHONE ENCOUNTER
----- Message from Obed Hendrix MD sent at 3/22/2022 12:31 PM EDT -----  Normal/negative  Office f/u with [facility] in 4-6 weeks

## 2022-03-22 NOTE — TELEPHONE ENCOUNTER
Called pt and left vm to call back.      Colonoscopy placed in recall for 2/28/2032 per procedure report.

## 2022-03-29 DIAGNOSIS — Z00.00 ANNUAL PHYSICAL EXAM: ICD-10-CM

## 2022-04-12 RX ORDER — LIDOCAINE 50 MG/G
1 OINTMENT TOPICAL 3 TIMES DAILY
Qty: 1 EACH | Refills: 0 | Status: SHIPPED | OUTPATIENT
Start: 2022-04-12

## 2022-05-19 RX ORDER — VALACYCLOVIR HYDROCHLORIDE 500 MG/1
500 TABLET, FILM COATED ORAL AS NEEDED
Qty: 90 TABLET | Refills: 3 | Status: SHIPPED | OUTPATIENT
Start: 2022-05-19 | End: 2022-09-30 | Stop reason: SDUPTHER

## 2022-05-26 ENCOUNTER — HOSPITAL ENCOUNTER (OUTPATIENT)
Dept: MAMMOGRAPHY | Facility: HOSPITAL | Age: 47
Discharge: HOME | End: 2022-05-26
Admitting: NURSE PRACTITIONER

## 2022-05-26 ENCOUNTER — APPOINTMENT (OUTPATIENT)
Dept: OTHER | Facility: HOSPITAL | Age: 47
End: 2022-05-26

## 2022-05-26 DIAGNOSIS — Z12.31 ENCOUNTER FOR SCREENING MAMMOGRAM FOR MALIGNANT NEOPLASM OF BREAST: ICD-10-CM

## 2022-05-26 PROCEDURE — 77067 SCR MAMMO BI INCL CAD: CPT

## 2022-05-26 PROCEDURE — 77063 BREAST TOMOSYNTHESIS BI: CPT

## 2022-05-26 PROCEDURE — 77067 SCR MAMMO BI INCL CAD: CPT | Performed by: RADIOLOGY

## 2022-05-26 PROCEDURE — 77063 BREAST TOMOSYNTHESIS BI: CPT | Performed by: RADIOLOGY

## 2022-07-30 ENCOUNTER — TELEPHONE (OUTPATIENT)
Dept: URGENT CARE | Facility: CLINIC | Age: 47
End: 2022-07-30

## 2022-07-30 NOTE — TELEPHONE ENCOUNTER
Patient called saying today she was to remove packing, 2nd day. She asks if she is to replace packing, none given. Area has been draining, appears better. Encouraged to remove, wash well with soap and water in shower and cover with dressing to catch drainage. Can come in to have me look at it no charge if she has any concerns.

## 2022-08-01 RX ORDER — FLUCONAZOLE 150 MG/1
150 TABLET ORAL DAILY
Qty: 2 TABLET | Refills: 0 | Status: SHIPPED | OUTPATIENT
Start: 2022-08-01 | End: 2022-11-10

## 2022-08-11 ENCOUNTER — TELEPHONE (OUTPATIENT)
Dept: FAMILY MEDICINE CLINIC | Facility: CLINIC | Age: 47
End: 2022-08-11

## 2022-08-11 RX ORDER — OMEPRAZOLE 40 MG/1
40 CAPSULE, DELAYED RELEASE ORAL DAILY
Qty: 90 CAPSULE | Refills: 2 | Status: SHIPPED | OUTPATIENT
Start: 2022-08-11

## 2022-08-11 NOTE — TELEPHONE ENCOUNTER
Caller: Kelly Wilson    Relationship: Self    Best call back number: [phone] (H)    PATIENT WAS WANTING TO HAVE A CORONARY CALCIUM SCAN DONE TO SEE HOW HER HEART IS FUNCTIONING.      PATIENT DOES NOT HAVE ANY CURRENT DIAGNOSIS'S OR CONCERNS AT ALL.     HER FAMILY HAS A HISTORY OF HEART ISSUES AND SHE JUST WANTS TO PROLONG HER LIFE/PREVENTATIVE MEASURES.     PATIENT WANTED TO KNOW IF DR CASTILLO COULD HELP HER GET SET UP FOR SOMETHING LIKE THIS.    NEXT APPOINTMENT IS SCHEDULED FOR 11/10/22 FOR A PHYSICAL.    PLEASE CALL AND ADVISE PATIENT

## 2022-09-09 RX ORDER — VALACYCLOVIR HYDROCHLORIDE 500 MG/1
500 TABLET, FILM COATED ORAL AS NEEDED
Qty: 90 TABLET | Refills: 3 | OUTPATIENT
Start: 2022-09-09

## 2022-09-29 ENCOUNTER — PATIENT MESSAGE (OUTPATIENT)
Dept: OBSTETRICS AND GYNECOLOGY | Facility: CLINIC | Age: 47
End: 2022-09-29

## 2022-09-30 RX ORDER — VALACYCLOVIR HYDROCHLORIDE 500 MG/1
500 TABLET, FILM COATED ORAL AS NEEDED
Qty: 60 TABLET | Refills: 0 | Status: SHIPPED | OUTPATIENT
Start: 2022-09-30 | End: 2022-10-10

## 2022-09-30 NOTE — TELEPHONE ENCOUNTER
From: Kelly Wilson  To: Manju Ortega, WILIAM  Sent: 9/29/2022 7:41 PM EDT  Subject: Still having herpes outbreak     Hi there, I am still having a herpes outbreak, going on now for 3 weeks. I have been taking 2000 mg a day and am down to 1 bottle. Is it possible to be prescribed more? I don’t know what to do, just want them to go away it’s miserable! Thank you!

## 2022-10-10 ENCOUNTER — PATIENT MESSAGE (OUTPATIENT)
Dept: OBSTETRICS AND GYNECOLOGY | Facility: CLINIC | Age: 47
End: 2022-10-10

## 2022-10-10 RX ORDER — VALACYCLOVIR HYDROCHLORIDE 500 MG/1
500 TABLET, FILM COATED ORAL 2 TIMES DAILY
Qty: 60 TABLET | Refills: 0 | Status: SHIPPED | OUTPATIENT
Start: 2022-10-10 | End: 2022-10-10

## 2022-10-10 RX ORDER — VALACYCLOVIR HYDROCHLORIDE 1 G/1
1000 TABLET, FILM COATED ORAL 2 TIMES DAILY
Qty: 60 TABLET | Refills: 0 | Status: SHIPPED | OUTPATIENT
Start: 2022-10-10

## 2022-10-10 NOTE — TELEPHONE ENCOUNTER
From: Kelly Wilson  To: WILIAM Morillo  Sent: 10/10/2022 10:54 AM EDT  Subject: Prescription for Valacyclovir     Hi there! I am so sorry I am hassling you all about this again! But I am still not clearing up with my sores. I think when I go to wipe after urination they are spreading? At any rate the pharmacy is asking for clarification on doses, so he said it was written 1 mg 2x daily and not as needed, that would work, in order for insurance to approve of me getting another prescription after having the 90 day supply that I ran through too quickly! I appreciate it greatly!

## 2022-10-10 NOTE — TELEPHONE ENCOUNTER
Spoke with pharmacy staff at [facility], verified that insurance will not cover the 500 mg BID. Pharmacy staff stated her insurance will not cover the 500 mg because the patient is not due for a refill yet. Pt increased her dose to 2g per day and finished the medication before refill due.   Spoke with Dimitris SWAIN and she is okay with changing the dose to 1g BID in order to get refill covered but patient needs to schedule an appointment to be evaluated for the extended length of herpes outbreak. May need to discuss different treatment options.

## 2022-10-12 ENCOUNTER — OFFICE VISIT (OUTPATIENT)
Dept: OBSTETRICS AND GYNECOLOGY | Facility: CLINIC | Age: 47
End: 2022-10-12

## 2022-10-12 VITALS
DIASTOLIC BLOOD PRESSURE: 68 MMHG | BODY MASS INDEX: 28.85 KG/M2 | HEIGHT: 62 IN | WEIGHT: 156.8 LBS | SYSTOLIC BLOOD PRESSURE: 117 MMHG

## 2022-10-12 DIAGNOSIS — N90.89 VULVAR LESION: Primary | ICD-10-CM

## 2022-10-12 DIAGNOSIS — A60.9 HSV (HERPES SIMPLEX VIRUS) ANOGENITAL INFECTION: ICD-10-CM

## 2022-10-12 PROCEDURE — 99213 OFFICE O/P EST LOW 20 MIN: CPT | Performed by: NURSE PRACTITIONER

## 2022-10-12 RX ORDER — ACYCLOVIR 400 MG/1
400 TABLET ORAL 2 TIMES DAILY
Qty: 60 TABLET | Refills: 12 | Status: SHIPPED | OUTPATIENT
Start: 2022-10-12 | End: 2022-11-11

## 2022-10-12 RX ORDER — FLUCONAZOLE 150 MG/1
150 TABLET ORAL AS NEEDED
Qty: 3 TABLET | Refills: 0 | Status: SHIPPED | OUTPATIENT
Start: 2022-10-12 | End: 2022-11-10

## 2022-10-12 RX ORDER — CEPHALEXIN 500 MG/1
500 CAPSULE ORAL 4 TIMES DAILY
Qty: 40 CAPSULE | Refills: 0 | Status: SHIPPED | OUTPATIENT
Start: 2022-10-12 | End: 2022-10-22

## 2022-10-12 NOTE — PROGRESS NOTES
Chief Complaint   Patient presents with   • Follow-up     Reoccurring HSV outbreak       Subjective   HPI  Kelly Wilson is a 46 y.o. female, . Her last LMP was Patient's last menstrual period was 10/12/2022 (exact date).. who presents for follow up on reoccuring HSV outbreak. Pt states she has been having reoccuring herpes outbreak for about 2 months. She has finished 3rd bottle of Valtrex last week. Pt also states she thinks it has become more prevalent before her period starts.     Pt would like to discuss other medication options as this one will not be approved by insurance.        She has been contacting our office and was treated with valtrex. Since then she reports her symptoms/issue has remained unchanged. The patient reports additional symptoms as none.        Additional OB/GYN History     Last Pap : 3/16/22 neg, HPV neg  Last Completed Pap Smear          Ordered - PAP SMEAR (Every 3 Years) Ordered on 3/29/2022    2022  SCANNED - PAP SMEAR                Last mammogram:   Last Completed Mammogram          MAMMOGRAM (Yearly) Next due on 2022  Mammo Screening Digital Tomosynthesis Bilateral With CAD    2020  Mammo Screening Digital Tomosynthesis Bilateral With CAD    2020  Patient-Reported (Performed Externally)                Tobacco Usage?: No   OB History        0    Para   0    Term   0       0    AB   0    Living   0       SAB   0    IAB   0    Ectopic   0    Molar   0    Multiple   0    Live Births   0                  Current Outpatient Medications:   •  buPROPion XL (WELLBUTRIN XL) 300 MG 24 hr tablet, WITH 150 TO = 450 MG, Disp: , Rfl: 0  •  fluconazole (Diflucan) 150 MG tablet, Take 1 tablet by mouth Daily. Take 1 PO today and the other in 3 days, Disp: 2 tablet, Rfl: 0  •  lidocaine (XYLOCAINE) 5 % ointment, Apply 1 application topically to the appropriate area as directed 3 (Three) Times a Day., Disp: 1 each, Rfl: 0  •   Lysine 1000 MG tablet, Take  by mouth., Disp: , Rfl:   •  MAGNESIUM PO, Take 1 tablet by mouth Daily., Disp: , Rfl:   •  Multiple Vitamin (MULTIVITAMIN) tablet, Take 1 tablet by mouth Daily., Disp: , Rfl:   •  norethindrone (MICRONOR) 0.35 MG tablet, Take 1 tablet by mouth Daily., Disp: , Rfl:   •  omeprazole (priLOSEC) 40 MG capsule, Take 1 capsule by mouth Daily., Disp: 90 capsule, Rfl: 2  •  ondansetron ODT (ZOFRAN-ODT) 4 MG disintegrating tablet, Take 1 tablet by mouth Every 6 (Six) Hours As Needed for Nausea or Vomiting., Disp: 10 tablet, Rfl: 0  •  Probiotic Product (PROBIOTIC PO), Take 1 tablet by mouth Daily., Disp: , Rfl:   •  QUEtiapine (SEROquel) 50 MG tablet, 1/2 a tab at night, Disp: , Rfl:   •  valACYclovir (VALTREX) 1000 MG tablet, Take 1 tablet by mouth 2 (Two) Times a Day., Disp: 60 tablet, Rfl: 0  •  acyclovir (ZOVIRAX) 400 MG tablet, Take 1 tablet by mouth 2 (Two) Times a Day. If outbreak occurs while on suppressive therapy, call office for further instructions., Disp: 60 tablet, Rfl: 12  •  buPROPion XL (WELLBUTRIN XL) 150 MG 24 hr tablet, Take 150 mg by mouth Every Morning. WITH 300 MG TO =450 MG, Disp: , Rfl:   •  cephalexin (Keflex) 500 MG capsule, Take 1 capsule by mouth 4 (Four) Times a Day for 10 days., Disp: 40 capsule, Rfl: 0  •  fluconazole (Diflucan) 150 MG tablet, Take 1 tablet by mouth As Needed (yeast) for up to 3 doses. Take one today and repeat every 3 days x 3 doses, Disp: 3 tablet, Rfl: 0     Past Medical History:   Diagnosis Date   • Anxiety    • Bergman esophagus 07/2021   • Bergman's esophagus 2015    [facility] YEUNG   • Cholelithiasis 02/1998   • Depressed    • Fibroid 2017   • GERD (gastroesophageal reflux disease)    • Herpes 2005   • Migraine 2018   • PMDD (premenstrual dysphoric disorder)    • PMS (premenstrual syndrome) 1995   • Varicella 1980        Past Surgical History:   Procedure Laterality Date   • BREAST BIOPSY  2017    Removal of cyst right breast   • BREAST CYST  "EXCISION Right 09/12/2017   • BREAST SURGERY      CYST REMOVED   • CHOLECYSTECTOMY  1997   • COLONOSCOPY  approx 1998    abrasion on duodenum per pt    • COLONOSCOPY W/ POLYPECTOMY N/A 02/28/2022    Procedure: COLONOSCOPY TO Cecum and TI;  Surgeon: Obed Hendrix MD;  Location:  [facility];  Service: Gastroenterology;  Laterality: N/A;  PRE: Screening  POST: Normal Colon   • ENDOSCOPY  08/10/2015    Fiona YEUNG   Bergman's esophagus, Gerd   • ENDOSCOPY N/A 02/28/2022    Procedure: ESOPHAGOGASTRODUODENOSCOPY WITH COLD BIOPSIES;  Surgeon: Obed Hendrix MD;  Location:  [facility];  Service: Gastroenterology;  Laterality: N/A;  PRE: Dyspepsia  POST: Small Hiatal Hernia   • LAPAROSCOPIC CHOLECYSTECTOMY  1998   • UPPER GASTROINTESTINAL ENDOSCOPY  2015   • WISDOM TOOTH EXTRACTION         The additional following portions of the patient's history were reviewed and updated as appropriate: allergies, current medications, past family history, past medical history, past social history, past surgical history and problem list.    Review of Systems   Constitutional: Negative.    Respiratory: Negative.    Cardiovascular: Negative.    Gastrointestinal: Negative.    Genitourinary: Positive for genital sores and vaginal pain.   Psychiatric/Behavioral: Negative.        I have reviewed and agree with the HPI, ROS, and historical information as entered above. Manju Ortega, APRN    Objective   /68   Ht 157.5 cm (62\")   Wt 71.1 kg (156 lb 12.8 oz)   LMP 10/12/2022 (Exact Date)   BMI 28.68 kg/m²     Physical Exam  Vitals and nursing note reviewed. Exam conducted with a chaperone present.   Constitutional:       Appearance: Normal appearance.   Pulmonary:      Effort: Pulmonary effort is normal.   Abdominal:      Palpations: Abdomen is soft.   Genitourinary:     Labia:         Right: Lesion present. No rash or tenderness.         Left: Lesion present. No rash or tenderness.       Vagina: Normal. No " lesions.      Cervix: No cervical motion tenderness, discharge, lesion or cervical bleeding.      Uterus: Normal. Not enlarged, not fixed and not tender.       Adnexa:         Right: No mass or tenderness.          Left: No mass or tenderness.        Rectum: No external hemorrhoid.          Comments: Lesions noted mostly near healed but with one lesion in left groin that is oozing purulent drainage, 2 cm firm abscess beneath the lesion. Chaperone Present  Neurological:      Mental Status: She is alert.         Assessment & Plan     Assessment     Problem List Items Addressed This Visit        Other    HSV (herpes simplex virus) anogenital infection    Overview     Consistent outbreaks since having COVID 8/2022. There is one area that seems to not heal. She has also had an abscess on her upper thigh since then that required drainage. Secondary infection of one lesion, will do keflex, culture sent. Start daily prophylaxis with acyclovir. She see's her PCP next month and will have routine labs, offered HIV testing and she declines for now, she is low risk.          Relevant Medications    valACYclovir (VALTREX) 1000 MG tablet    acyclovir (ZOVIRAX) 400 MG tablet   Other Visit Diagnoses     Vulvar lesion    -  Primary    Relevant Orders    Anaerobic & Aerobic Culture ([facility] Only) - Swab, Groin, left          Secondary infection of HSV lesion    Plan     1. Start keflex qid x 10, culture sent. Encouraged sitz bath 1-2x daily to help drain the abscessed area.  2. Start daily acyclovir prophylaxis  3. Once lesions healed recommended using Hibiclens to her groin area once daily x 5 days  4. Keep routine F/U with PCP  5. RTO for annual and prn      Manju Ortega, APRN  10/12/2022

## 2022-10-17 LAB
BACTERIA SPEC AEROBE CULT: ABNORMAL
BACTERIA SPEC ANAEROBE CULT: ABNORMAL
BACTERIA SPEC CULT: ABNORMAL
BACTERIA SPEC CULT: ABNORMAL
Lab: NORMAL
OTHER ANTIBIOTIC SUSC ISLT: ABNORMAL

## 2022-11-10 ENCOUNTER — OFFICE VISIT (OUTPATIENT)
Dept: FAMILY MEDICINE CLINIC | Facility: CLINIC | Age: 47
End: 2022-11-10

## 2022-11-10 VITALS
TEMPERATURE: 98.7 F | HEIGHT: 62 IN | WEIGHT: 154.8 LBS | HEART RATE: 83 BPM | BODY MASS INDEX: 28.49 KG/M2 | DIASTOLIC BLOOD PRESSURE: 68 MMHG | SYSTOLIC BLOOD PRESSURE: 100 MMHG | OXYGEN SATURATION: 99 %

## 2022-11-10 DIAGNOSIS — R53.83 FATIGUE, UNSPECIFIED TYPE: ICD-10-CM

## 2022-11-10 DIAGNOSIS — K59.00 CONSTIPATION, UNSPECIFIED CONSTIPATION TYPE: Primary | ICD-10-CM

## 2022-11-10 PROBLEM — E66.9 OBESITY WITH BODY MASS INDEX 30 OR GREATER: Status: ACTIVE | Noted: 2022-11-10

## 2022-11-10 PROCEDURE — 99213 OFFICE O/P EST LOW 20 MIN: CPT | Performed by: FAMILY MEDICINE

## 2022-11-10 NOTE — PROGRESS NOTES
Chief Complaint  Annual Exam (Fasting )    Subjective        Kelly Wilson presents to [facility]  History of Present Illness  Kelly Wilson is a 46-year-old female who presents to the clinic today for concerns regarding chronic constipation and issues with her immune system.    Constipation  She states that she has been taking a variety of laxatives and MiraLAX intermittently over her lifetime, which have been helpful. She notes that she has always had constipation, but it has become more difficult to deal with recently. She adds that every 2 weeks, she will have to take a Dulcolax. She has not tried enemas or Fleet enemas. She has been taking a natural supplement daily and a beet supplement daily for the past week. She has not been on a consistent stool regimen before. She fears that eventually it will get to the point that she is unable to push stool out. She denies having any blood in her stool or vomiting. She reports occasional abdominal pain prior to a bowel movement or after a more prolonged period of constipation. This resolves after defecating. She also states that she has recently started becoming nauseated after eating. She reports that she had a bowel movement this morning after taking more MiraLAX and 2 more beet supplements. She notes that she has oatmeal and vegetables in the morning. She adds that she has taken psyllium in the past, which caused bloating. She states that she started going to the gym again yesterday, which has been helpful in the past.     Immune system  The patient is concerned about her immune system. She had a couple of episodes with abscesses and a recent bout of herpes simplex genital herpes. She states that the herpes occurred following an infection with COVID-19 in 08/2022. She was prescribed antivirals and antibiotics. She later had another course of antibiotics for an abscess. She was started on suppression therapy with acyclovir and she  "currently takes 400 mg twice a day. She states that the genital herpes has cleared up.     Of note, in discussion of potential factors impacting her immune system, the patient reports that she was prescribed Synthroid as a teenager. She has been fatigued lately    Objective   Vital Signs:  /68   Pulse 83   Temp 98.7 °F (37.1 °C)   Ht 157.5 cm (62\")   Wt 70.2 kg (154 lb 12.8 oz)   SpO2 99%   BMI 28.31 kg/m²   Estimated body mass index is 28.31 kg/m² as calculated from the following:    Height as of this encounter: 157.5 cm (62\").    Weight as of this encounter: 70.2 kg (154 lb 12.8 oz).          Physical Exam  Vitals and nursing note reviewed.   Constitutional:       Appearance: Normal appearance. She is well-developed and normal weight.   HENT:      Head: Normocephalic and atraumatic.      Right Ear: External ear normal.      Left Ear: External ear normal.      Nose: Nose normal.   Eyes:      General: No scleral icterus.     Conjunctiva/sclera: Conjunctivae normal.   Cardiovascular:      Rate and Rhythm: Normal rate and regular rhythm.      Heart sounds: Normal heart sounds.   Pulmonary:      Effort: Pulmonary effort is normal.      Breath sounds: Normal breath sounds.   Abdominal:      General: Bowel sounds are normal. There is no distension.      Palpations: Abdomen is soft. There is no mass.      Tenderness: There is no abdominal tenderness. There is no right CVA tenderness, left CVA tenderness, guarding or rebound.      Hernia: No hernia is present.   Musculoskeletal:      Cervical back: Normal range of motion and neck supple.      Right lower leg: No edema.      Left lower leg: No edema.   Lymphadenopathy:      Cervical: No cervical adenopathy.   Skin:     General: Skin is warm and dry.      Findings: No rash.   Neurological:      Mental Status: She is alert and oriented to person, place, and time.   Psychiatric:         Mood and Affect: Mood normal.         Behavior: Behavior normal.         " Thought Content: Thought content normal.         Judgment: Judgment normal.        Result Review :                Assessment and Plan   Diagnoses and all orders for this visit:    1. Constipation, unspecified constipation type (Primary)  -     TSH  -     Comprehensive Metabolic Panel    2. Fatigue, unspecified type  -     TSH  -     CBC & Differential  -     Comprehensive Metabolic Panel    1. Chronic constipation.  - She was advised to take Benefiber and Culturelle probiotic daily.  - She was also advised to drink plenty of water.  - Discussed using Colace if it is needed.   -Discussed the need to use this bowel regimen on a daily basis for the rest of her life.  Reassured patient that it is healthy to do so  -Patient will follow-up if her symptoms persist in spite of the above efforts    2. Fatigue.  - Labs will be obtained today.  Any abnormal findings will be addressed.         Follow Up   No follow-ups on file.  Patient was given instructions and counseling regarding her condition or for health maintenance advice. Please see specific information pulled into the AVS if appropriate.     Transcribed from ambient dictation for Lynnette Yan DO by Maria Elena Tenorio [employer] .  11/10/22   13:02 EST    Patient or patient representative verbalized consent to the visit recording.

## 2022-11-11 LAB
ALBUMIN SERPL-MCNC: 4.3 G/DL (ref 3.8–4.8)
ALBUMIN/GLOB SERPL: 1.8 {RATIO} (ref 1.2–2.2)
ALP SERPL-CCNC: 66 IU/L (ref 44–121)
ALT SERPL-CCNC: 20 IU/L (ref 0–32)
AST SERPL-CCNC: 18 IU/L (ref 0–40)
BASOPHILS # BLD AUTO: 0.1 X10E3/UL (ref 0–0.2)
BASOPHILS NFR BLD AUTO: 1 %
BILIRUB SERPL-MCNC: 0.3 MG/DL (ref 0–1.2)
BUN SERPL-MCNC: 13 MG/DL (ref 6–24)
BUN/CREAT SERPL: 15 (ref 9–23)
CALCIUM SERPL-MCNC: 9.7 MG/DL (ref 8.7–10.2)
CHLORIDE SERPL-SCNC: 102 MMOL/L (ref 96–106)
CO2 SERPL-SCNC: 22 MMOL/L (ref 20–29)
CREAT SERPL-MCNC: 0.87 MG/DL (ref 0.57–1)
EGFRCR SERPLBLD CKD-EPI 2021: 84 ML/MIN/1.73
EOSINOPHIL # BLD AUTO: 0.1 X10E3/UL (ref 0–0.4)
EOSINOPHIL NFR BLD AUTO: 2 %
ERYTHROCYTE [DISTWIDTH] IN BLOOD BY AUTOMATED COUNT: 13.1 % (ref 11.7–15.4)
GLOBULIN SER CALC-MCNC: 2.4 G/DL (ref 1.5–4.5)
GLUCOSE SERPL-MCNC: 83 MG/DL (ref 70–99)
HCT VFR BLD AUTO: 42.5 % (ref 34–46.6)
HGB BLD-MCNC: 14.3 G/DL (ref 11.1–15.9)
IMM GRANULOCYTES # BLD AUTO: 0 X10E3/UL (ref 0–0.1)
IMM GRANULOCYTES NFR BLD AUTO: 0 %
LYMPHOCYTES # BLD AUTO: 2.7 X10E3/UL (ref 0.7–3.1)
LYMPHOCYTES NFR BLD AUTO: 33 %
MCH RBC QN AUTO: 29.4 PG (ref 26.6–33)
MCHC RBC AUTO-ENTMCNC: 33.6 G/DL (ref 31.5–35.7)
MCV RBC AUTO: 87 FL (ref 79–97)
MONOCYTES # BLD AUTO: 0.6 X10E3/UL (ref 0.1–0.9)
MONOCYTES NFR BLD AUTO: 7 %
NEUTROPHILS # BLD AUTO: 4.6 X10E3/UL (ref 1.4–7)
NEUTROPHILS NFR BLD AUTO: 57 %
PLATELET # BLD AUTO: 293 X10E3/UL (ref 150–450)
POTASSIUM SERPL-SCNC: 4.8 MMOL/L (ref 3.5–5.2)
PROT SERPL-MCNC: 6.7 G/DL (ref 6–8.5)
RBC # BLD AUTO: 4.87 X10E6/UL (ref 3.77–5.28)
SODIUM SERPL-SCNC: 138 MMOL/L (ref 134–144)
TSH SERPL DL<=0.005 MIU/L-ACNC: 1.49 UIU/ML (ref 0.45–4.5)
WBC # BLD AUTO: 8.1 X10E3/UL (ref 3.4–10.8)

## 2023-03-02 ENCOUNTER — OFFICE VISIT (OUTPATIENT)
Dept: OBSTETRICS AND GYNECOLOGY | Facility: CLINIC | Age: 48
End: 2023-03-02
Payer: COMMERCIAL

## 2023-03-02 VITALS
WEIGHT: 161 LBS | DIASTOLIC BLOOD PRESSURE: 76 MMHG | BODY MASS INDEX: 29.63 KG/M2 | SYSTOLIC BLOOD PRESSURE: 118 MMHG | HEIGHT: 62 IN

## 2023-03-02 DIAGNOSIS — N95.1 PERIMENOPAUSAL SYMPTOMS: ICD-10-CM

## 2023-03-02 DIAGNOSIS — R23.2 HOT FLASHES: Primary | ICD-10-CM

## 2023-03-02 DIAGNOSIS — Z12.31 ENCOUNTER FOR SCREENING MAMMOGRAM FOR MALIGNANT NEOPLASM OF BREAST: ICD-10-CM

## 2023-03-02 PROCEDURE — 99213 OFFICE O/P EST LOW 20 MIN: CPT | Performed by: NURSE PRACTITIONER

## 2023-03-02 RX ORDER — ACETAMINOPHEN AND CODEINE PHOSPHATE 120; 12 MG/5ML; MG/5ML
1 SOLUTION ORAL DAILY
Qty: 84 TABLET | Refills: 1 | Status: SHIPPED | OUTPATIENT
Start: 2023-03-02

## 2023-03-02 RX ORDER — ACYCLOVIR 400 MG/1
TABLET ORAL TAKE AS DIRECTED
COMMUNITY
Start: 2023-01-07

## 2023-03-02 NOTE — PROGRESS NOTES
"     Consult Perimenopause            Consult for perimenopause symptoms         Subjective     HPI  Kelly Wilson is a 47 y.o. female, , who presents to discuss perimenopause symptoms and treatment as a established patient . Patient's last menstrual period was 2023 (exact date)..  Patient reports problems with:hot flashes, changes in libido, irritability and moodiness.  Her periods are irregular and getting further apart and only spotting . She reports dysmenorrhea is none. Partner Status: Marital Status: . She is is sexually active. She has not had new partners.. STD testing recommendations have been explained to the patient and she does not desire STD testing. There were no changes to her medical or surgical history since her last visit.       Additional OB/GYN History   Current contraception: contraceptive methods: Oral progesterone-only contraceptive  Desires to: continue contraception    Last Pap : 2022. Result: negative. HPV: negative.   Last Completed Pap Smear          Ordered - PAP SMEAR (Every 3 Years) Ordered on 3/29/2022    2022  SCANNED - PAP SMEAR              History of abnormal Pap smear: yes - \"8-10 yrs ago had abnormal PAP with normal colposcopy\"  Family history of uterine, colon, breast, or ovarian cancer: no  Performs monthly Self-Breast Exam: yes  Last mammogram: 2022. Done at outlying facility.    Last Completed Mammogram          Ordered - MAMMOGRAM (Yearly) Ordered on 3/2/2023    2022  Mammo Screening Digital Tomosynthesis Bilateral With CAD    2020  Mammo Screening Digital Tomosynthesis Bilateral With CAD    2020  Patient-Reported (Performed Externally)                History of abnormal mammogram: yes - benign cyst removed secondary to abnormal mammogram in 2018    Colonoscopy: has had a colonoscopy 1 year(s) ago.  Exercises Regularly: yes  Feelings of Anxiety or Depression: no  Tobacco Usage?: No       Current Outpatient " Medications:   •  acyclovir (ZOVIRAX) 400 MG tablet, Take As Directed., Disp: , Rfl:   •  buPROPion XL (WELLBUTRIN XL) 150 MG 24 hr tablet, Take 1 tablet by mouth Every Morning. WITH 300 MG TO =450 MG, Disp: , Rfl:   •  buPROPion XL (WELLBUTRIN XL) 300 MG 24 hr tablet, WITH 150 TO = 450 MG, Disp: , Rfl: 0  •  lidocaine (XYLOCAINE) 5 % ointment, Apply 1 application topically to the appropriate area as directed 3 (Three) Times a Day., Disp: 1 each, Rfl: 0  •  Lysine 1000 MG tablet, Take  by mouth., Disp: , Rfl:   •  Multiple Vitamin (MULTIVITAMIN) tablet, Take 1 tablet by mouth Daily., Disp: , Rfl:   •  norethindrone (MICRONOR) 0.35 MG tablet, Take 1 tablet by mouth Daily., Disp: 84 tablet, Rfl: 1  •  omeprazole (priLOSEC) 40 MG capsule, Take 1 capsule by mouth Daily., Disp: 90 capsule, Rfl: 2  •  ondansetron ODT (ZOFRAN-ODT) 4 MG disintegrating tablet, Take 1 tablet by mouth Every 6 (Six) Hours As Needed for Nausea or Vomiting., Disp: 10 tablet, Rfl: 0  •  Probiotic Product (PROBIOTIC PO), Take 1 tablet by mouth Daily., Disp: , Rfl:   •  QUEtiapine (SEROquel) 50 MG tablet, 1/2 a tab at night, Disp: , Rfl:   •  MAGNESIUM PO, Take 1 tablet by mouth Daily., Disp: , Rfl:   •  valACYclovir (VALTREX) 1000 MG tablet, Take 1 tablet by mouth 2 (Two) Times a Day., Disp: 60 tablet, Rfl: 0     Patient is requesting refills of norethindrone.    OB History        0    Para   0    Term   0       0    AB   0    Living   0       SAB   0    IAB   0    Ectopic   0    Molar   0    Multiple   0    Live Births   0                Past Medical History:   Diagnosis Date   • Anxiety    • Bergman esophagus 2021   • Bergman's esophagus     [facility]iona YEUNG   • Cholelithiasis 1998   • Depressed    • Fibroid    • GERD (gastroesophageal reflux disease)    • Herpes    • Migraine    • PMDD (premenstrual dysphoric disorder)    • PMS (premenstrual syndrome)    • Varicella         Past Surgical History:   Procedure  Laterality Date   • BREAST BIOPSY  2017    Removal of cyst right breast   • BREAST CYST EXCISION Right 09/12/2017   • BREAST SURGERY      CYST REMOVED   • CHOLECYSTECTOMY  1997   • COLONOSCOPY  approx 1998    abrasion on duodenum per pt    • COLONOSCOPY W/ POLYPECTOMY N/A 02/28/2022    Procedure: COLONOSCOPY TO Cecum and TI;  Surgeon: Obed Hendrix MD;  Location:  [facility];  Service: Gastroenterology;  Laterality: N/A;  PRE: Screening  POST: Normal Colon   • ENDOSCOPY  08/10/2015    Fiona YEUNG   Bergman's esophagus, Gerd   • ENDOSCOPY N/A 02/28/2022    Procedure: ESOPHAGOGASTRODUODENOSCOPY WITH COLD BIOPSIES;  Surgeon: Obed Hendrix MD;  [facility];  Service: Gastroenterology;  Laterality: N/A;  PRE: Dyspepsia  POST: Small Hiatal Hernia   • LAPAROSCOPIC CHOLECYSTECTOMY  1998   • UPPER GASTROINTESTINAL ENDOSCOPY  2015   • WISDOM TOOTH EXTRACTION         Health Maintenance   Topic Date Due   • Pneumococcal Vaccine 0-64 (1 - PCV) Never done   • TDAP/TD VACCINES (1 - Tdap) Never done   • HEPATITIS C SCREENING  Never done   • COVID-19 Vaccine (3 - Booster for Moderna series) 03/17/2021   • INFLUENZA VACCINE  08/01/2022   • ANNUAL PHYSICAL  03/16/2023   • Annual Gynecologic Pelvic and Breast Exam  03/17/2023   • MAMMOGRAM  05/26/2023   • PAP SMEAR  03/16/2025   • COLORECTAL CANCER SCREENING  02/28/2032       The additional following portions of the patient's history were reviewed and updated as appropriate: allergies, current medications, past family history, past medical history, past social history, past surgical history and problem list.    Review of Systems   Constitutional: Negative.    Respiratory: Negative.    Cardiovascular: Negative.    Gastrointestinal: Negative.    Endocrine: Positive for heat intolerance.   Genitourinary: Positive for decreased libido.   Psychiatric/Behavioral: Positive for agitation.         I have reviewed and agree with the HPI, ROS, and historical  "information as entered above. Manju BRISENO Shannon, APRN      Objective   /76   Ht 157.5 cm (62\")   Wt 73 kg (161 lb)   LMP 01/16/2023 (Exact Date)   BMI 29.45 kg/m²     Physical Exam  Constitutional:       Appearance: Normal appearance.   Pulmonary:      Effort: Pulmonary effort is normal.   Neurological:      Mental Status: She is alert.            Assessment and Plan    Problem List Items Addressed This Visit    None  Visit Diagnoses     Hot flashes    -  Primary    Relevant Orders    TSH    T4, Free    Perimenopausal symptoms        Encounter for screening mammogram for malignant neoplasm of breast        Relevant Orders    Mammo Screening Digital Tomosynthesis Bilateral With CAD        Declined annual exam today  UPT at home negative, on POP. Check thyroid function. She can try estroven OTC. She has been on wellbutrin for many years with no dose changes. She will discuss psych med management with her provider and see if they think changing/adding an SSRI may be helpful.    1. Consult for perimenopause. Opts out of FSH, estradiol labs after review.   2. Pap guidelines reviewed.  3. Reviewed monthly self breast exams.  Instructed to call with lumps, pain, or breast discharge.    4. Ordered Mammogram today  5. Return in about 6 months (around 9/2/2023) for Annual physical.     Manju Ortega, WILIAM  03/02/2023  "

## 2023-03-03 LAB
T4 FREE SERPL-MCNC: 1.1 NG/DL (ref 0.93–1.7)
TSH SERPL DL<=0.005 MIU/L-ACNC: 0.99 UIU/ML (ref 0.27–4.2)

## 2023-03-10 ENCOUNTER — TRANSCRIBE ORDERS (OUTPATIENT)
Dept: ADMINISTRATIVE | Facility: HOSPITAL | Age: 48
End: 2023-03-10
Payer: COMMERCIAL

## 2023-03-10 DIAGNOSIS — Z12.31 VISIT FOR SCREENING MAMMOGRAM: Primary | ICD-10-CM
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
## 2023-05-05 RX ORDER — OMEPRAZOLE 40 MG/1
40 CAPSULE, DELAYED RELEASE ORAL DAILY
Qty: 90 CAPSULE | Refills: 2 | OUTPATIENT
Start: 2023-05-05

## 2023-05-30 RX ORDER — OMEPRAZOLE 40 MG/1
CAPSULE, DELAYED RELEASE ORAL
Qty: 30 CAPSULE | Refills: 0 | Status: SHIPPED | OUTPATIENT
Start: 2023-05-30 | End: 2023-05-30 | Stop reason: SDUPTHER

## 2023-05-30 RX ORDER — OMEPRAZOLE 40 MG/1
40 CAPSULE, DELAYED RELEASE ORAL DAILY
Qty: 30 CAPSULE | Refills: 0 | Status: SHIPPED | OUTPATIENT
Start: 2023-05-30

## 2023-06-14 ENCOUNTER — OFFICE VISIT (OUTPATIENT)
Dept: GASTROENTEROLOGY | Facility: CLINIC | Age: 48
End: 2023-06-14
Payer: COMMERCIAL

## 2023-06-14 VITALS
SYSTOLIC BLOOD PRESSURE: 106 MMHG | BODY MASS INDEX: 29.06 KG/M2 | WEIGHT: 157.9 LBS | TEMPERATURE: 96.8 F | DIASTOLIC BLOOD PRESSURE: 70 MMHG | HEIGHT: 62 IN | OXYGEN SATURATION: 98 % | HEART RATE: 74 BPM

## 2023-06-14 DIAGNOSIS — K21.9 GASTROESOPHAGEAL REFLUX DISEASE WITHOUT ESOPHAGITIS: Primary | ICD-10-CM

## 2023-06-14 PROCEDURE — 99213 OFFICE O/P EST LOW 20 MIN: CPT | Performed by: INTERNAL MEDICINE

## 2023-06-14 RX ORDER — HYDROXYZINE 50 MG/1
TABLET, FILM COATED ORAL
COMMUNITY
Start: 2023-05-22

## 2023-06-14 RX ORDER — FLUCONAZOLE 150 MG/1
TABLET ORAL
COMMUNITY
Start: 2023-03-03

## 2023-06-14 RX ORDER — FLUOXETINE HYDROCHLORIDE 20 MG/1
1 CAPSULE ORAL DAILY
COMMUNITY
Start: 2023-05-22

## 2023-06-14 RX ORDER — OMEPRAZOLE 40 MG/1
40 CAPSULE, DELAYED RELEASE ORAL DAILY
Qty: 90 CAPSULE | Refills: 3 | Status: SHIPPED | OUTPATIENT
Start: 2023-06-14

## 2023-06-14 NOTE — PROGRESS NOTES
Chief Complaint   Patient presents with    Heartburn     Subjective     HPI  Kelly Wilson is a 47 y.o. female who presents today for office follow up.  She has a history of GERD and dyspepsia.  She underwent bidirectional endoscopy in February of last year.  Her reports and path are noted below.  She is currently maintained on omeprazole 40 mg a day.  Generally does well on this regimen she will have some occasional breakthrough symptoms for which she will supplement with an additional dose of omeprazole in the evenings this usually happens maybe 2-3 times per month.  She had a CMP performed in November with normal creatinine.      ENDOSCOPY - EGD (02/28/2022) - path negative  ENDOSCOPY - COLON (02/28/2022) - normal    Objective   Vitals:    06/14/23 0804   BP: 106/70   Pulse: 74   Temp: 96.8 °F (36 °C)   SpO2: 98%       Physical Exam  The following data was reviewed by: Obed Hendrix MD on 06/14/2023:  Common labs          11/10/2022    11:53   Common Labs   Glucose 83    BUN 13    Creatinine 0.87    Sodium 138    Potassium 4.8    Chloride 102    Calcium 9.7    Total Protein 6.7    Albumin 4.3    Total Bilirubin 0.3    Alkaline Phosphatase 66    AST (SGOT) 18    ALT (SGPT) 20    WBC 8.1    Hemoglobin 14.3    Hematocrit 42.5    Platelets 293      Data reviewed : GI studies as per HPI      Assessment & Plan   Assessment:     1. Gastroesophageal reflux disease without esophagitis      Plan:   Continue current dose PPI, advised pt if finds she is supplementing regularly with additional dose of omeprazole will recommend change in therapy  Office f/u in 1 year          Obed Hendrix M.D.  [facility]  [address] [place] [address]  Office: [phone]

## 2023-08-15 RX ORDER — ACYCLOVIR 400 MG/1
TABLET ORAL
Qty: 180 TABLET | Refills: 0 | Status: SHIPPED | OUTPATIENT
Start: 2023-08-15

## 2023-10-09 ENCOUNTER — HOSPITAL ENCOUNTER (OUTPATIENT)
Dept: MAMMOGRAPHY | Facility: HOSPITAL | Age: 48
Discharge: HOME | End: 2023-10-09
Admitting: NURSE PRACTITIONER
Payer: COMMERCIAL

## 2023-10-09 DIAGNOSIS — Z12.31 VISIT FOR SCREENING MAMMOGRAM: ICD-10-CM

## 2023-10-09 PROCEDURE — 77067 SCR MAMMO BI INCL CAD: CPT

## 2023-10-09 PROCEDURE — 77063 BREAST TOMOSYNTHESIS BI: CPT

## 2023-10-26 NOTE — TELEPHONE ENCOUNTER
Detail Level: Simple Pt is calling back to schedule scope, [phone]   Detail Level: Zone Sunscreen Recommendations: SPF > 30 or higher and reapply [id number].

## 2024-04-16 RX ORDER — ACYCLOVIR 400 MG/1
TABLET ORAL
Qty: 180 TABLET | Refills: 0 | Status: SHIPPED | OUTPATIENT
Start: 2024-04-16

## 2024-07-08 RX ORDER — OMEPRAZOLE 40 MG/1
40 CAPSULE, DELAYED RELEASE ORAL DAILY
Qty: 90 CAPSULE | Refills: 3 | OUTPATIENT
Start: 2024-07-08

## 2024-07-09 ENCOUNTER — TELEPHONE (OUTPATIENT)
Dept: GASTROENTEROLOGY | Facility: CLINIC | Age: 49
End: 2024-07-09
Payer: COMMERCIAL

## 2024-07-09 RX ORDER — OMEPRAZOLE 40 MG/1
40 CAPSULE, DELAYED RELEASE ORAL DAILY
Qty: 90 CAPSULE | Refills: 3 | OUTPATIENT
Start: 2024-07-09

## 2024-07-09 NOTE — TELEPHONE ENCOUNTER
----- Message from [facility] sent at 7/9/2024  8:25 AM EDT -----  Regarding: Appointment Request  Contact: [phone]  Appointment Request From: Kelly Wilson    With Provider: Obed Hendrix [[facility] GASTRO[facility]OLOGY]    Preferred Date Range: Any    Preferred Times: Any Time    Reason for visit: Follow-up    Comments:  Yearly follow up; prescription renewal

## 2024-07-23 ENCOUNTER — OFFICE VISIT (OUTPATIENT)
Dept: OBSTETRICS AND GYNECOLOGY | Facility: CLINIC | Age: 49
End: 2024-07-23
Payer: COMMERCIAL

## 2024-07-23 VITALS
HEIGHT: 62 IN | WEIGHT: 170.6 LBS | SYSTOLIC BLOOD PRESSURE: 118 MMHG | DIASTOLIC BLOOD PRESSURE: 62 MMHG | BODY MASS INDEX: 31.39 KG/M2

## 2024-07-23 DIAGNOSIS — F32.81 PMDD (PREMENSTRUAL DYSPHORIC DISORDER): Primary | ICD-10-CM

## 2024-07-23 DIAGNOSIS — N94.10 FEMALE DYSPAREUNIA: ICD-10-CM

## 2024-07-23 PROCEDURE — 99214 OFFICE O/P EST MOD 30 MIN: CPT | Performed by: NURSE PRACTITIONER

## 2024-07-23 RX ORDER — PRAVASTATIN SODIUM 10 MG
10 TABLET ORAL DAILY
COMMUNITY
Start: 2024-03-11 | End: 2025-03-11

## 2024-07-23 RX ORDER — NORETHINDRONE ACETATE AND ETHINYL ESTRADIOL, ETHINYL ESTRADIOL AND FERROUS FUMARATE 1MG-10(24)
1 KIT ORAL DAILY
Qty: 84 TABLET | Refills: 1 | Status: SHIPPED | OUTPATIENT
Start: 2024-07-23

## 2024-07-23 NOTE — PROGRESS NOTES
Chief Complaint   Patient presents with    Hormones       Subjective   HPI  Kelly Wilson is a 48 y.o. female, . Her last LMP was Patient's last menstrual period was 2024 (approximate).. who presents to discuss hormonal issues.     Patient reports issues with night sweats, fatigue, migraines without aura and worsened PMDD x 6-7 months.  She reports always having PMDD, but it has significantly worsened.    She reports that her physch  provider recently increase Prozac to 60 mg with her last period.     Recent labs completed 24 and are in epic.     Her periods are regular, every 25-35 days, lasting 3-5 days.  She reports that the flow is heavy in the beginning, but begins to lighten around day 3.  She denies saturation of a pad/tampon <1 hour.     She reports that she had a thyroid ultrasound was completed on Saturday.       Additional OB/GYN History     Last Pap :   Last Completed Pap Smear            PAP SMEAR (Every 3 Years) Next due on 3/16/2025      2022  SCANNED - PAP SMEAR                    Last mammogram:   Last Completed Mammogram       This patient has no relevant Health Maintenance data.              OB History          0    Para   0    Term   0       0    AB   0    Living   0         SAB   0    IAB   0    Ectopic   0    Molar   0    Multiple   0    Live Births   0                  Current Outpatient Medications:     acyclovir (ZOVIRAX) 400 MG tablet, TAKE 1 TABLET BY MOUTH 2 (TWO) TIMES A DAY. IF OUTBREAK OCCURS WHILE ON SUPPRESSIVE THERAPY, CALL OFFICE FOR FURTHER INSTRUCTIONS., Disp: 180 tablet, Rfl: 0    buPROPion XL (WELLBUTRIN XL) 300 MG 24 hr tablet, WITH 150 TO = 450 MG, Disp: , Rfl: 0    FLUoxetine (PROzac) 20 MG capsule, Take 1 capsule by mouth Daily., Disp: , Rfl:     hydrOXYzine (ATARAX) 50 MG tablet, TAKE 1 TO 2 CAPS AT NIGHT FOR SLEEP, Disp: , Rfl:     MAGNESIUM PO, Take 1 tablet by mouth Daily., Disp: , Rfl:     Multiple Vitamin  (MULTIVITAMIN) tablet, Take 1 tablet by mouth Daily., Disp: , Rfl:     omeprazole (priLOSEC) 40 MG capsule, Take 1 capsule by mouth Daily., Disp: 90 capsule, Rfl: 3    pravastatin (PRAVACHOL) 10 MG tablet, Take 1 tablet by mouth Daily., Disp: , Rfl:     Probiotic Product (PROBIOTIC PO), Take 1 tablet by mouth Daily., Disp: , Rfl:     Norethin-Eth Estrad-Fe Biphas (Lo Loestrin Fe) 1 MG-10 MCG / 10 MCG tablet, Take 1 tablet by mouth Daily., Disp: 84 tablet, Rfl: 1    ondansetron ODT (ZOFRAN-ODT) 4 MG disintegrating tablet, Take 1 tablet by mouth Every 6 (Six) Hours As Needed for Nausea or Vomiting., Disp: 10 tablet, Rfl: 0     Past Medical History:   Diagnosis Date    Anxiety     Bergman esophagus 07/2021    Bergman's esophagus 2015    Fiona YEUNG    Cholelithiasis 02/1998    Depressed     Fibroid 2017    GERD (gastroesophageal reflux disease)     Herpes 2005    Migraine 2018    PMDD (premenstrual dysphoric disorder)     PMS (premenstrual syndrome) 1995    Varicella 1980        Past Surgical History:   Procedure Laterality Date    BREAST BIOPSY  2017    Removal of cyst right breast    BREAST CYST EXCISION Right 09/12/2017    BREAST SURGERY      CYST REMOVED    CHOLECYSTECTOMY  1997    COLONOSCOPY  approx 1998    abrasion on duodenum per pt     COLONOSCOPY W/ POLYPECTOMY N/A 02/28/2022    Procedure: COLONOSCOPY TO Cecum and TI;  Surgeon: Obed Hendrix MD;  Location: [facility] ENDOSCOPY;  Service: Gastroenterology;  Laterality: N/A;  PRE: Screening  POST: Normal Colon    ENDOSCOPY  08/10/2015    Fiona YEUNG   Bergman's esophagus, Gerd    ENDOSCOPY N/A 02/28/2022    Procedure: ESOPHAGOGASTRODUODENOSCOPY WITH COLD BIOPSIES;  Surgeon: Obed Hendrix MD;  Location: [facility] ENDOSCOPY;  Service: Gastroenterology;  Laterality: N/A;  PRE: Dyspepsia  POST: Small Hiatal Hernia    LAPAROSCOPIC CHOLECYSTECTOMY  1998    UPPER GASTROINTESTINAL ENDOSCOPY  2015    WISDOM TOOTH EXTRACTION         The additional following  "portions of the patient's history were reviewed and updated as appropriate: allergies, current medications, past family history, past medical history, past social history, past surgical history, and problem list.    Review of Systems   Constitutional: Negative.    Psychiatric/Behavioral:  Positive for agitation, behavioral problems, depressed mood and stress. Negative for self-injury and suicidal ideas. The patient is nervous/anxious.        I have reviewed and agree with the HPI, ROS, and historical information as entered above. Manju BRISENO Shannon, APRN      Objective   /62   Ht 157.5 cm (62\")   Wt 77.4 kg (170 lb 9.6 oz)   LMP 07/13/2024 (Approximate)   BMI 31.20 kg/m²     Physical Exam  Constitutional:       Appearance: Normal appearance.   Pulmonary:      Effort: Pulmonary effort is normal.   Neurological:      Mental Status: She is alert.         Assessment & Plan     Assessment     Problem List Items Addressed This Visit          Genitourinary and Reproductive     PMDD (premenstrual dysphoric disorder) - Primary    Overview     Recently worsening over the last 6 months to the point where she is concerned she could lose her job. Her psych provider did increase her prozac to 60 mg daily a few weeks ago, we dicussed this has not had time to take full effect yet. Discussed the next step would be a OUMAR, she is 48 and has a hx of high cholesterol, we discussed VTE risk. Discussed risk/benefit and she really feels she needs to try the OUMAR due to the significant impact it has on her life.          Relevant Medications    buPROPion XL (WELLBUTRIN XL) 300 MG 24 hr tablet    hydrOXYzine (ATARAX) 50 MG tablet    FLUoxetine (PROzac) 20 MG capsule     Other Visit Diagnoses       Female dyspareunia        Relevant Orders    US Non-ob Transvaginal          Reviewed recent lab results. Discussed that adding OUMAR to treat PMDD would be the next step, VTE risk reviewed in detail.     2.  Advised coconut oil for " lubrication, will add U/S with annual and follow up      Plan     Start OCP with next menses, BUM x 1 month.  Advised improved diet, and increased exercise  Return in about 3 months (around 10/23/2024) for Annual physical U/S .    Total time spent today with Kelly  was 30 minutes (level 4).  Off this time, > 50% was spent face-to-face time coordinating care, answering her questions and counseling regarding changes in menopause due to hormone deficiencies, contraceptive choices and efficacy rates, healthy eating habits, menstrual changes in the fannie-menopausal period , and pathophysiology of her presenting problem along with plans for any diagnositc work-up and treatment.       Manju Ortega, APRN  07/23/2024

## 2024-07-25 ENCOUNTER — TELEPHONE (OUTPATIENT)
Dept: OBSTETRICS AND GYNECOLOGY | Facility: CLINIC | Age: 49
End: 2024-07-25
Payer: COMMERCIAL

## 2024-07-25 NOTE — TELEPHONE ENCOUNTER
Once the patient left the clinic following her appointment on 7/23/24 I realized her social history indicated that she sometimes smokes and I wanted to discuss this with her as it was not revealed in our in depth conversation about CV risk and combined oral contraceptives. She has not yet responded to my my chart message or multiple phone calls so we will cancel the prescription for Lo-loestrin until we have discussed this further with Kelly.

## 2024-10-09 ENCOUNTER — OFFICE VISIT (OUTPATIENT)
Dept: GASTROENTEROLOGY | Facility: CLINIC | Age: 49
End: 2024-10-09
Payer: COMMERCIAL

## 2024-10-09 VITALS
DIASTOLIC BLOOD PRESSURE: 81 MMHG | WEIGHT: 172.2 LBS | BODY MASS INDEX: 31.69 KG/M2 | HEIGHT: 62 IN | OXYGEN SATURATION: 98 % | SYSTOLIC BLOOD PRESSURE: 112 MMHG | HEART RATE: 88 BPM

## 2024-10-09 DIAGNOSIS — K21.9 GASTROESOPHAGEAL REFLUX DISEASE WITHOUT ESOPHAGITIS: Primary | ICD-10-CM

## 2024-10-09 RX ORDER — OMEPRAZOLE 40 MG/1
40 CAPSULE, DELAYED RELEASE ORAL DAILY
Qty: 90 CAPSULE | Refills: 3 | Status: SHIPPED | OUTPATIENT
Start: 2024-10-09

## 2024-10-09 RX ORDER — ARIPIPRAZOLE 2 MG/1
1 TABLET ORAL DAILY
COMMUNITY
Start: 2024-09-09

## 2024-10-09 NOTE — PROGRESS NOTES
"Chief Complaint  Heartburn    Subjective          History of Present Illness    Kelly Wilson is a  48 y.o. female presents for follow-up on GERD and dyspepsia.  She is a patient Dr. Hendrix last seen on 6/14/2023.  She is new to me.    She is on omeprazole 40 mg daily for history of GERD/dyspepsia.  This works well for her.  She has run out of medication and has been taking over-the-counter dosing which does not seem to work as well for her.    6/21/2024 CMP unremarkable.    2/28/2022 EGD with negative pathology  2/28/2022 colonoscopy was normal.    Objective   Vital Signs:   /81 (BP Location: Right arm, Patient Position: Sitting, Cuff Size: Adult)   Pulse 88   Ht 157.5 cm (62.01\")   Wt 78.1 kg (172 lb 3.2 oz)   SpO2 98%   BMI 31.49 kg/m²       Physical Exam  Vitals reviewed.   Constitutional:       General: She is awake. She is not in acute distress.     Appearance: Normal appearance. She is well-developed and well-groomed.   HENT:      Head: Normocephalic.   Pulmonary:      Effort: Pulmonary effort is normal. No respiratory distress.   Skin:     Coloration: Skin is not pale.   Neurological:      Mental Status: She is alert and oriented to person, place, and time.      Gait: Gait is intact.   Psychiatric:         Mood and Affect: Mood and affect normal.         Speech: Speech normal.         Behavior: Behavior is cooperative.         Judgment: Judgment normal.          Result Review :             Assessment and Plan    Diagnoses and all orders for this visit:    1. Gastroesophageal reflux disease without esophagitis (Primary)    Other orders  -     omeprazole (priLOSEC) 40 MG capsule; Take 1 capsule by mouth Daily.  Dispense: 90 capsule; Refill: 3    Recommend increasing to 40mg Omeprazole daily which worked better for her.  Prescription sent.     We did discuss the risks of long-term PPI use, including osteoporosis, hypomagnesemia, kidney disease, and increased risk of certain infections, associated " cardiovascular disease, possibly even dementia, and I suggested multivitamin and calcium supplementation if PPI use is to be prolonged.    Follow Up   Return in about 1 year (around 10/9/2025).    Dragon dictation used throughout this note.     Elva Cheatham PA-C

## 2024-10-29 ENCOUNTER — OFFICE VISIT (OUTPATIENT)
Dept: OBSTETRICS AND GYNECOLOGY | Facility: CLINIC | Age: 49
End: 2024-10-29
Payer: COMMERCIAL

## 2024-10-29 VITALS
HEIGHT: 62 IN | DIASTOLIC BLOOD PRESSURE: 80 MMHG | BODY MASS INDEX: 32.94 KG/M2 | WEIGHT: 179 LBS | SYSTOLIC BLOOD PRESSURE: 112 MMHG

## 2024-10-29 DIAGNOSIS — A60.9 HSV (HERPES SIMPLEX VIRUS) ANOGENITAL INFECTION: ICD-10-CM

## 2024-10-29 DIAGNOSIS — F32.81 PMDD (PREMENSTRUAL DYSPHORIC DISORDER): Primary | ICD-10-CM

## 2024-10-29 DIAGNOSIS — Z01.419 WOMEN'S ANNUAL ROUTINE GYNECOLOGICAL EXAMINATION: ICD-10-CM

## 2024-10-29 DIAGNOSIS — Z12.31 ENCOUNTER FOR SCREENING MAMMOGRAM FOR MALIGNANT NEOPLASM OF BREAST: ICD-10-CM

## 2024-10-29 DIAGNOSIS — N94.10 FEMALE DYSPAREUNIA: ICD-10-CM

## 2024-10-29 RX ORDER — DROSPIRENONE 4 MG/1
1 TABLET, FILM COATED ORAL DAILY
Qty: 84 TABLET | Refills: 4 | Status: SHIPPED | OUTPATIENT
Start: 2024-10-29

## 2024-10-29 RX ORDER — ACYCLOVIR 400 MG/1
400 TABLET ORAL 2 TIMES DAILY
Qty: 60 TABLET | Refills: 12 | Status: SHIPPED | OUTPATIENT
Start: 2024-10-29

## 2024-10-29 NOTE — PROGRESS NOTES
Chief Complaint   Patient presents with    PMDD       Subjective   HPI  Kelly Wilson is a 48 y.o. female, . Her last LMP was No LMP recorded.. who presents for follow up on PMDD.      At her last visit she was treated with  none . Since then she reports her symptoms/issue has remained unchanged. The patient reports additional symptoms as  patient would like to discuss being put on some estrogen  .        Additional OB/GYN History     Last Pap :   Last Completed Pap Smear            Ordered - PAP SMEAR (Every 3 Years) Ordered on 3/29/2022      2022  SCANNED - PAP SMEAR                    Last mammogram:   Last Completed Mammogram       This patient has no relevant Health Maintenance data.            Tobacco Usage?: {Tobacco Usage Optional:44176}   OB History          0    Para   0    Term   0       0    AB   0    Living   0         SAB   0    IAB   0    Ectopic   0    Molar   0    Multiple   0    Live Births   0                  Current Outpatient Medications:     acyclovir (ZOVIRAX) 400 MG tablet, TAKE 1 TABLET BY MOUTH 2 (TWO) TIMES A DAY. IF OUTBREAK OCCURS WHILE ON SUPPRESSIVE THERAPY, CALL OFFICE FOR FURTHER INSTRUCTIONS., Disp: 180 tablet, Rfl: 0    ARIPiprazole (ABILIFY) 2 MG tablet, Take 1 tablet by mouth Daily., Disp: , Rfl:     FLUoxetine (PROzac) 20 MG capsule, Take 1 capsule by mouth Daily., Disp: , Rfl:     hydrOXYzine (ATARAX) 50 MG tablet, TAKE 1 TO 2 CAPS AT NIGHT FOR SLEEP, Disp: , Rfl:     MAGNESIUM PO, Take 1 tablet by mouth Daily., Disp: , Rfl:     Multiple Vitamin (MULTIVITAMIN) tablet, Take 1 tablet by mouth Daily., Disp: , Rfl:     omeprazole (priLOSEC) 40 MG capsule, Take 1 capsule by mouth Daily., Disp: 90 capsule, Rfl: 3    ondansetron ODT (ZOFRAN-ODT) 4 MG disintegrating tablet, Take 1 tablet by mouth Every 6 (Six) Hours As Needed for Nausea or Vomiting., Disp: 10 tablet, Rfl: 0    pravastatin (PRAVACHOL) 10 MG tablet, Take 1 tablet by mouth Daily.,  "Disp: , Rfl:     Probiotic Product (PROBIOTIC PO), Take 1 tablet by mouth Daily., Disp: , Rfl:      Past Medical History:   Diagnosis Date    Anxiety     Bergman esophagus 07/2021    Bergman's esophagus 2015    Fiona YEUNG    Cholelithiasis 02/1998    Depressed     Fibroid 2017    GERD (gastroesophageal reflux disease)     Herpes 2005    Migraine 2018    PMDD (premenstrual dysphoric disorder)     PMS (premenstrual syndrome) 1995    Varicella 1980        Past Surgical History:   Procedure Laterality Date    BREAST BIOPSY  2017    Removal of cyst right breast    BREAST CYST EXCISION Right 09/12/2017    BREAST SURGERY      CYST REMOVED    CHOLECYSTECTOMY  1997    COLONOSCOPY  approx 1998    abrasion on duodenum per pt     COLONOSCOPY W/ POLYPECTOMY N/A 02/28/2022    Procedure: COLONOSCOPY TO Cecum and TI;  Surgeon: Obed Hendrix MD;  Location:  [facility];  Service: Gastroenterology;  Laterality: N/A;  PRE: Screening  POST: Normal Colon    ENDOSCOPY  08/10/2015    Fiona YEUNG   Bergman's esophagus, Gerd    ENDOSCOPY N/A 02/28/2022    Procedure: ESOPHAGOGASTRODUODENOSCOPY WITH COLD BIOPSIES;  Surgeon: Obed Hendrix MD;  Location:  [facility];  Service: Gastroenterology;  Laterality: N/A;  PRE: Dyspepsia  POST: Small Hiatal Hernia    LAPAROSCOPIC CHOLECYSTECTOMY  1998    UPPER GASTROINTESTINAL ENDOSCOPY  2015    WISDOM TOOTH EXTRACTION         The additional following portions of the patient's history were reviewed and updated as appropriate: {history reviewed:20406::\"allergies\",\"current medications\",\"past family history\",\"past medical history\",\"past social history\",\"past surgical history\",\"problem list\"}.    Review of Systems    I have reviewed and agree with the HPI, ROS, and historical information as entered above. ***    Objective   /80   Ht 157.5 cm (62.01\")   Wt 81.2 kg (179 lb)   BMI 32.73 kg/m²     Physical Exam    Assessment & Plan     Assessment     Problem List Items " Addressed This Visit       PMDD (premenstrual dysphoric disorder) - Primary    Overview     Recently worsening over the last 6 months to the point where she is concerned she could lose her job. Her psych provider did increase her prozac to 60 mg daily a few weeks ago, we dicussed this has not had time to take full effect yet. Discussed the next step would be a OUMAR, she is 48 and has a hx of high cholesterol, we discussed VTE risk. Discussed risk/benefit and she really feels she needs to try the OUMAR due to the significant impact it has on her life.          Relevant Medications    hydrOXYzine (ATARAX) 50 MG tablet    FLUoxetine (PROzac) 20 MG capsule    ARIPiprazole (ABILIFY) 2 MG tablet    Other Relevant Orders    US Non-ob Transvaginal       ***    Plan     {plan; gyn:45531}  ***  No follow-ups on file.        Maria Guadalupe Brown MA  10/29/2024

## 2024-10-29 NOTE — PROGRESS NOTES
Gynecologic Annual Exam Note          GYN Annual Exam     PMDD        Subjective     HPI  Kelly Wilson is a 48 y.o. female, , who presents for annual well woman exam as a established patient. There were no changes to her medical or surgical history since her last visit..  Patient's last menstrual period was 2024 (approximate).  Her periods occur every 28-30 days, lasting 6 days.  The flow is moderate. She reports dysmenorrhea is moderate occurring throughout menses. Marital Status: . She is sexually active. She has not had new partners.. STD testing recommendations have been explained to the patient and she declines STD testing.    The patient would like to discuss the following complaints today: PMDD    Additional OB/GYN History   contraceptive methods: Condoms  Desires to:  discuss  contraception  History of migraines: yes without aura    Last Pap : 2022. Result: negative. HPV: negative.   Last Completed Pap Smear            Ordered - PAP SMEAR (Every 3 Years) Ordered on 3/29/2022      2022  SCANNED - PAP SMEAR                  History of abnormal Pap smear: yes - 10 years ago  Family history of uterine, colon, breast, or ovarian cancer: no  Performs monthly Self-Breast Exam: yes  Last mammogram: 10/09/2023. Done at [facility]. There is a copy in the chart.    Last Completed Mammogram       This patient has no relevant Health Maintenance data.            Colonoscopy: has had a colonoscopy 2 yrs ago  Exercises Regularly: no  Feelings of Anxiety or Depression: yes - medication  Tobacco Usage?: Yes Kelly Wilson  reports that she has been smoking electronic cigarette. She has never used smokeless tobacco. I have educated her on the risk of diseases from using tobacco products such as cancer, COPD, and heart disease.     I advised her to quit and she is not willing to quit.                Current Outpatient Medications:     acyclovir (ZOVIRAX) 400 MG tablet, Take 1 tablet by mouth 2  (Two) Times a Day. Take no more than 5 doses a day., Disp: 60 tablet, Rfl: 12    ARIPiprazole (ABILIFY) 2 MG tablet, Take 1 tablet by mouth Daily., Disp: , Rfl:     FLUoxetine (PROzac) 20 MG capsule, Take 1 capsule by mouth Daily., Disp: , Rfl:     MAGNESIUM PO, Take 1 tablet by mouth Daily., Disp: , Rfl:     Multiple Vitamin (MULTIVITAMIN) tablet, Take 1 tablet by mouth Daily., Disp: , Rfl:     omeprazole (priLOSEC) 40 MG capsule, Take 1 capsule by mouth Daily., Disp: 90 capsule, Rfl: 3    ondansetron ODT (ZOFRAN-ODT) 4 MG disintegrating tablet, Take 1 tablet by mouth Every 6 (Six) Hours As Needed for Nausea or Vomiting., Disp: 10 tablet, Rfl: 0    pravastatin (PRAVACHOL) 10 MG tablet, Take 1 tablet by mouth Daily., Disp: , Rfl:     Probiotic Product (PROBIOTIC PO), Take 1 tablet by mouth Daily., Disp: , Rfl:     Drospirenone (Slynd) 4 MG tablet, Take 1 tablet by mouth Daily., Disp: 84 tablet, Rfl: 4          OB History          0    Para   0    Term   0       0    AB   0    Living   0         SAB   0    IAB   0    Ectopic   0    Molar   0    Multiple   0    Live Births   0                Past Medical History:   Diagnosis Date    Anxiety     Bergman esophagus 2021    Bergman's esophagus     Fiona YEUNG    Cholelithiasis 1998    Depressed     Fibroid 2017    GERD (gastroesophageal reflux disease)     Herpes 2005    Migraine     PMDD (premenstrual dysphoric disorder)     PMS (premenstrual syndrome)     Varicella         Past Surgical History:   Procedure Laterality Date    BREAST BIOPSY  2017    Removal of cyst right breast    BREAST CYST EXCISION Right 2017    BREAST SURGERY      CYST REMOVED    CHOLECYSTECTOMY      COLONOSCOPY  approx     abrasion on duodenum per pt     COLONOSCOPY W/ POLYPECTOMY N/A 2022    Procedure: COLONOSCOPY TO Cecum and TI;  Surgeon: Obed Hendrix MD;  Location: [facility] ENDOSCOPY;  Service: Gastroenterology;  Laterality: N/A;   "PRE: Screening  POST: Normal Colon    ENDOSCOPY  08/10/2015    Fiona YEUNG   Bergman's esophagus, Gerd    ENDOSCOPY N/A 02/28/2022    Procedure: ESOPHAGOGASTRODUODENOSCOPY WITH COLD BIOPSIES;  Surgeon: Obed Hendrix MD;  Location: [facility] ENDOSCOPY;  Service: Gastroenterology;  Laterality: N/A;  PRE: Dyspepsia  POST: Small Hiatal Hernia    LAPAROSCOPIC CHOLECYSTECTOMY  1998    UPPER GASTROINTESTINAL ENDOSCOPY  2015    WISDOM TOOTH EXTRACTION         Health Maintenance   Topic Date Due    BMI FOLLOWUP  Never done    Pneumococcal Vaccine 0-64 (1 of 2 - PCV) Never done    HEPATITIS C SCREENING  Never done    ANNUAL PHYSICAL  03/16/2023    Annual Gynecologic Pelvic and Breast Exam  03/17/2023    INFLUENZA VACCINE  08/01/2024    COVID-19 Vaccine (3 - 2023-24 season) 09/01/2024    MAMMOGRAM  10/09/2024    PAP SMEAR  03/16/2025    COLORECTAL CANCER SCREENING  02/28/2032    TDAP/TD VACCINES (2 - Td or Tdap) 07/27/2033       The additional following portions of the patient's history were reviewed and updated as appropriate: allergies, current medications, past family history, past medical history, past social history, past surgical history, and problem list.    Review of Systems   Constitutional: Negative.    Respiratory: Negative.     Cardiovascular: Negative.    Gastrointestinal: Negative.    Genitourinary: Negative.    Psychiatric/Behavioral:  Positive for depressed mood. Negative for self-injury and suicidal ideas. The patient is nervous/anxious.          I have reviewed and agree with the HPI, ROS, and historical information as entered above. Manju Ortega, APRN          Objective   /80   Ht 157.5 cm (62.01\")   Wt 81.2 kg (179 lb)   LMP 09/27/2024 (Approximate)   BMI 32.73 kg/m²     Physical Exam  Constitutional:       Appearance: Normal appearance.   Neck:      Thyroid: No thyroid mass or thyromegaly.   Pulmonary:      Effort: Pulmonary effort is normal.   Chest:      Chest wall: No mass. "   Breasts:     Right: Normal. No inverted nipple, mass, nipple discharge or skin change.      Left: Normal. No inverted nipple, mass, nipple discharge or skin change.   Abdominal:      General: There is no distension.      Palpations: Abdomen is soft. There is no mass.      Tenderness: There is no abdominal tenderness.      Hernia: No hernia is present.   Genitourinary:     General: Normal vulva.      Labia:         Right: No rash.         Left: No rash.       Vagina: Normal.      Cervix: No cervical motion tenderness or lesion.      Uterus: Normal.       Adnexa: Right adnexa normal and left adnexa normal.        Right: No mass or tenderness.          Left: No mass or tenderness.     Neurological:      Mental Status: She is alert.            Assessment and Plan    Problem List Items Addressed This Visit          Genitourinary and Reproductive     PMDD (premenstrual dysphoric disorder) - Primary    Overview     Recently worsening over the last 6 months to the point where she is concerned she could lose her job. Her psych provider did increase her prozac to 60 mg daily a few weeks ago, we dicussed this has not had time to take full effect yet. Discussed the next step would be a OCP, she is 48, vapes, and has a hx of high cholesterol, we discussed VTE risk and not good OUMAR candidate. We can try slynd and she will continue to work with her psych providers. Adding abilify has improved symptoms and she has F/U soon, may need increased.          Relevant Medications    FLUoxetine (PROzac) 20 MG capsule    ARIPiprazole (ABILIFY) 2 MG tablet    Other Relevant Orders    US Non-ob Transvaginal (Completed)       Other    HSV (herpes simplex virus) anogenital infection    Overview     Consistent outbreaks since having COVID 8/2022. There is one area that seems to not heal. She has also had an abscess on her upper thigh since then that required drainage. Secondary infection of one lesion, will do keflex, culture sent. Start daily  prophylaxis with acyclovir. She see's her PCP next month and will have routine labs, offered HIV testing and she declines for now, she is low risk.          Relevant Medications    acyclovir (ZOVIRAX) 400 MG tablet     Other Visit Diagnoses       Female dyspareunia        Women's annual routine gynecological examination        Encounter for screening mammogram for malignant neoplasm of breast        Relevant Orders    Mammo Screening Digital Tomosynthesis Bilateral With CAD          U/S today is normal. Try coconut oil as lubricant  Start slynd with next menses to see if improvement in menses and PMDD. Not a good candidate for OUMAR due to vaping.   Taking acyclovir for HSV prophylaxis    GYN annual well woman exam.   Pap guidelines reviewed.  Reviewed monthly self breast exams.  Instructed to call with lumps, pain, or breast discharge.    Ordered Mammogram today  Reviewed exercise as a preventative health measures.   Return in about 1 year (around 10/29/2025), or if symptoms worsen or fail to improve, for Annual physical.     Manju Ortega, APRN  10/29/2024

## 2024-12-09 RX ORDER — DROSPIRENONE 4 MG/1
1 TABLET, FILM COATED ORAL DAILY
Qty: 84 TABLET | Refills: 4 | OUTPATIENT
Start: 2024-12-09

## 2025-06-15 RX ORDER — OMEPRAZOLE 40 MG/1
40 CAPSULE, DELAYED RELEASE ORAL DAILY
Qty: 90 CAPSULE | Refills: 3 | Status: SHIPPED | OUTPATIENT
Start: 2025-06-15

## 2025-08-01 ENCOUNTER — TELEPHONE (OUTPATIENT)
Dept: GASTROENTEROLOGY | Facility: CLINIC | Age: 50
End: 2025-08-01
Payer: COMMERCIAL

## 2025-08-01 NOTE — TELEPHONE ENCOUNTER
"Left dutch to schedule follow up w/Elva     When she calls back elva\"s first available is when she will be back in the office   Ok for hub to schedule   "

## 2025-08-01 NOTE — TELEPHONE ENCOUNTER
[facility] staff attempted to follow warm transfer process and was unsuccessful     Caller: Kelly Wilson    Relationship to patient: Self    Best call back number: [phone]    Patient is needing: PT RETURNING CALL TO [facility] TO SCHEDULE AN APPT WITH KRISS BLEVINS.  [facility] COULD NOT SCHEDULE AS WE HAVE NOT BEEN GIVEN KRISS'S RETURN DATE TO THE OFFICE.  PLEASE CALL PT BACK TO SCHEDULE.

## 2025-08-01 NOTE — TELEPHONE ENCOUNTER
Ok for the hub to relay and schedule.  Left vm for the pt to call back and schedule a follow up with Elva Cheatham.

## 2025-08-25 ENCOUNTER — TELEPHONE (OUTPATIENT)
Dept: GASTROENTEROLOGY | Facility: CLINIC | Age: 50
End: 2025-08-25
Payer: COMMERCIAL

## (undated) DEVICE — TUBING, SUCTION, 1/4" X 10', STRAIGHT: Brand: MEDLINE

## (undated) DEVICE — CANN O2 ETCO2 FITS ALL CONN CO2 SMPL A/ 7IN DISP LF

## (undated) DEVICE — ADAPT CLN BIOGUARD AIR/H2O DISP

## (undated) DEVICE — KT ORCA ORCAPOD DISP STRL

## (undated) DEVICE — LN SMPL CO2 SHTRM SD STREAM W/M LUER

## (undated) DEVICE — BITEBLOCK OMNI BLOC

## (undated) DEVICE — SENSR O2 OXIMAX FNGR A/ 18IN NONSTR

## (undated) DEVICE — SINGLE-USE BIOPSY FORCEPS: Brand: RADIAL JAW 4